# Patient Record
Sex: FEMALE | Race: WHITE | NOT HISPANIC OR LATINO | Employment: FULL TIME | URBAN - METROPOLITAN AREA
[De-identification: names, ages, dates, MRNs, and addresses within clinical notes are randomized per-mention and may not be internally consistent; named-entity substitution may affect disease eponyms.]

---

## 2017-01-11 ENCOUNTER — PRENATAL OFFICE VISIT (OUTPATIENT)
Dept: FAMILY MEDICINE | Facility: CLINIC | Age: 32
End: 2017-01-11
Payer: COMMERCIAL

## 2017-01-11 VITALS
OXYGEN SATURATION: 99 % | BODY MASS INDEX: 30.86 KG/M2 | DIASTOLIC BLOOD PRESSURE: 60 MMHG | SYSTOLIC BLOOD PRESSURE: 110 MMHG | HEIGHT: 66 IN | TEMPERATURE: 96.4 F | HEART RATE: 105 BPM | RESPIRATION RATE: 14 BRPM | WEIGHT: 192 LBS

## 2017-01-11 PROCEDURE — 99207 ZZC POST PARTUM EXAM: CPT | Performed by: FAMILY MEDICINE

## 2017-01-11 ASSESSMENT — PAIN SCALES - GENERAL: PAINLEVEL: NO PAIN (0)

## 2017-01-11 NOTE — PROGRESS NOTES
"  Amy is here for a 6-week postpartum checkup.    She had a c/s for abnormal FHT of a viable boy, weight 6 pounds 11 oz., with no complications. Date of delivery was 16. Since delivery, she has been breast feeding.  She has no signs of infection, bleeding or other complications.  She is not pregnant.  We discussed contraceptions and she has chosen none.      Post partum tubal: No  History of Gestational Diabetes? No  Type of Delivery:    Feeding Method:  Pumping  If initiated breast feeding and stopped, how long did you breast feed?:  She wants to pump and bottle for at least another month.     REVIEW OF SYSTEMS:  Ears/Nose/Throat: negative  Respiratory: negative  Cardiovascular: negative  Gastrointestinal: negative  Genitourinary: negative  Musculoskeletal: negative    Neurologic: negative   Skin: negative   Endocrine:  negative  Vagina: negative  Cervix: negative  Breasts: negative  Vulva: negative  Episiotomy: NA    Contraception Plan: none    Medical History Reviewed yes - updated   Family History Reviewed yes - updated   Problem List Updated yes - updated     EXAM:  /60 mmHg  Pulse 105  Temp(Src) 96.4  F (35.8  C) (Temporal)  Resp 14  Ht 5' 6\" (1.676 m)  Wt 192 lb (87.091 kg)  BMI 31.00 kg/m2  SpO2 99%  LMP 2016  HEENT: grossly normal.  NECK: no lymphadenopathy or thyroidomegaly.  LUNGS: CTA X 2, no rales or crackles.  BACK: No spinal or CVA tenderness.  HEART: RRR without murmurs clicks or gallops.  ABDOMEN: soft, non tender, good bowel sounds, without masses rebound, guarding or tenderness.  PELVIC:  No pelvic exam was indicated.   EXTREMITIES:  warm to touch, good pulses, no ankle edema or calf tenderness.  NEUROLOGIC: grossly normal.    ASSESSMENT:   6-week postpartum exam after c/s for abnormal FHT.    PLAN:    Contraception methods discussed  Discussed calcium intake, vitamins and supplements  Exercise encouraged  Follow up in 1 year  Increase fiber intake  Increase " fluid intake  Seat belt use encouraged  Weight loss recommended.  One-hour glucose tolerance test needed? No  Nothing for contraception at this time.  She might want to start a combination pill once she stops pumping.  She will let me know. Both she and her  are ok if they get pregnant right away.  I told them that it is best to let her CS scar heal for a good year prior to stressing it with another pregnancy.      Electronically signed by:  Mello Nguyễn M.D.  1/11/2017

## 2017-01-11 NOTE — NURSING NOTE
"Chief Complaint   Patient presents with     Prenatal Care     post partum       Initial /60 mmHg  Pulse 105  Temp(Src) 96.4  F (35.8  C) (Temporal)  Resp 14  Ht 5' 6\" (1.676 m)  Wt 192 lb (87.091 kg)  BMI 31.00 kg/m2  SpO2 99%  LMP 02/24/2016 Estimated body mass index is 31 kg/(m^2) as calculated from the following:    Height as of this encounter: 5' 6\" (1.676 m).    Weight as of this encounter: 192 lb (87.091 kg).  BP completed using cuff size: juan Álvarez MA 1/11/2017        "

## 2017-08-22 ENCOUNTER — OFFICE VISIT (OUTPATIENT)
Dept: BEHAVIORAL HEALTH | Facility: CLINIC | Age: 32
End: 2017-08-22
Payer: COMMERCIAL

## 2017-08-22 DIAGNOSIS — F43.23 ADJUSTMENT DISORDER WITH MIXED ANXIETY AND DEPRESSED MOOD: Primary | ICD-10-CM

## 2017-08-22 PROCEDURE — 99207 ZZC NO CHARGE BEHAVIORAL WARM HANDOFF: CPT | Performed by: MARRIAGE & FAMILY THERAPIST

## 2017-08-22 NOTE — MR AVS SNAPSHOT
After Visit Summary   8/22/2017    Amy Moore    MRN: [id number]           Patient Information     Date Of Birth          1985        Visit Information        Provider Department      8/22/2017 1:45 PM Ruth Heebrt LMFT [facility]        Today's Diagnoses     Adjustment disorder with mixed anxiety and depressed mood    -  1       Follow-ups after your visit        Your next 10 appointments already scheduled     Sep 07, 2017  8:30 AM CDT   New Visit with JAYDEN Valdes   [facility] ([facility]    [address] [place] [id number] [phone]              Who to contact     If you have questions or need follow up information about today's clinic visit or your schedule please contact [facility] directly at [phone].  Normal or non-critical lab and imaging results will be communicated to you by MyChart, letter or phone within 4 business days after the clinic has received the results. If you do not hear from us within 7 days, please contact the clinic through [employer] or phone. If you have a critical or abnormal lab result, we will notify you by phone as soon as possible.  Submit refill requests through [employer] or call your pharmacy and they will forward the refill request to us. Please allow 3 business days for your refill to be completed.          Additional Information About Your Visit        MyChart Information     [employer] gives you secure access to your electronic health record. If you see a primary care provider, you can also send messages to your care team and make appointments. If you have questions, please call your primary care clinic.  If you do not have a primary care provider, please call [phone] and they will assist you.        Care EveryWhere ID     This is your Care EveryWhere ID. This could be used by other organizations to access your [facility] medical records  [id number]          Blood Pressure from Last 3 Encounters:   01/11/17 110/60   11/22/16 122/76   11/17/16 105/50    Weight from Last 3 Encounters:   01/11/17 87.1 kg (192 lb)   11/08/16 99.5 kg (219 lb 4 oz)   10/18/16 98.4 kg (217 lb)              Today, you had the following     No orders found for display       Primary Care Provider Office Phone # Fax #    Mello Nguyễn [phone]       [address] [place] [address]        Equal Access to Services     [facility]: Hadii margarito coronado hadasho Soomaali, waaxda luqadaha, qaybta kaalmada aderoyeryada, grant raman . So [facility] [phone].    ATENCIÓN: Si habla español, tiene a encinas disposición servicios gratuitos de asistencia lingüística. [facility] [phone].    We comply with applicable federal civil rights laws and [place] laws. We do not discriminate on the basis of race, color, national origin, age, disability sex, sexual orientation or gender identity.            Thank you!     Thank you for choosing [facility]  for your care. Our goal is always to provide you with excellent care. Hearing back from our patients is one way we can continue to improve our services. Please take a few minutes to complete the written survey that you may receive in the mail after your visit with us. Thank you!             Your Updated Medication List - Protect others around you: Learn how to safely use, store and throw away your medicines at www.disposemymeds.org.          This list is accurate as of: 8/22/17  1:48 PM.  Always use your most recent med list.                   Brand Name Dispense Instructions for use Diagnosis    order for DME     1 Device    Equipment being ordered: Electric Breast Pump  Sig:  Use as directed.    Breast feeding status of mother       PRENATAL VITAMIN PO      Take 1 tablet by mouth daily

## 2017-08-22 NOTE — PROGRESS NOTES
Warm-handoff    C met with patient, by PCP request. C informed and explained integrated health model, use of brief therapy interventions, as well as referrals and support services for ongoing long-term therapy.  Patient was in clinic for her son's well child check and she informed her PCP that she is having difficulty with anxiety and more tearfulness. Patient states that there is stress with changes in her work schedule and balancing everything in her life. She identified that she feels bad about meeting with a therapist and that she is also wanting things to change. Validated and normalized patient's emotions. Reframed that it takes strength to recognize when you want to make change and take the steps to do so. Patient scheduled an initial visit with this writer for 9/7/17.

## 2017-09-07 ENCOUNTER — OFFICE VISIT (OUTPATIENT)
Dept: BEHAVIORAL HEALTH | Facility: CLINIC | Age: 32
End: 2017-09-07
Payer: COMMERCIAL

## 2017-09-07 DIAGNOSIS — F43.21 ADJUSTMENT DISORDER WITH DEPRESSED MOOD: ICD-10-CM

## 2017-09-07 DIAGNOSIS — F41.1 GAD (GENERALIZED ANXIETY DISORDER): Primary | ICD-10-CM

## 2017-09-07 PROCEDURE — 90791 PSYCH DIAGNOSTIC EVALUATION: CPT | Performed by: MARRIAGE & FAMILY THERAPIST

## 2017-09-07 ASSESSMENT — ANXIETY QUESTIONNAIRES
IF YOU CHECKED OFF ANY PROBLEMS ON THIS QUESTIONNAIRE, HOW DIFFICULT HAVE THESE PROBLEMS MADE IT FOR YOU TO DO YOUR WORK, TAKE CARE OF THINGS AT HOME, OR GET ALONG WITH OTHER PEOPLE: SOMEWHAT DIFFICULT
6. BECOMING EASILY ANNOYED OR IRRITABLE: MORE THAN HALF THE DAYS
2. NOT BEING ABLE TO STOP OR CONTROL WORRYING: NEARLY EVERY DAY
1. FEELING NERVOUS, ANXIOUS, OR ON EDGE: MORE THAN HALF THE DAYS
GAD7 TOTAL SCORE: 15
7. FEELING AFRAID AS IF SOMETHING AWFUL MIGHT HAPPEN: MORE THAN HALF THE DAYS
3. WORRYING TOO MUCH ABOUT DIFFERENT THINGS: NEARLY EVERY DAY
5. BEING SO RESTLESS THAT IT IS HARD TO SIT STILL: SEVERAL DAYS

## 2017-09-07 ASSESSMENT — PATIENT HEALTH QUESTIONNAIRE - PHQ9
5. POOR APPETITE OR OVEREATING: MORE THAN HALF THE DAYS
SUM OF ALL RESPONSES TO PHQ QUESTIONS 1-9: 6

## 2017-09-07 NOTE — PROGRESS NOTES
[facility]  Integrated Behavioral Health Services   Diagnostic Assessment      PATIENT'S NAME: Amy Moore  MRN:   [id number]  :   1985  DATE OF SERVICE: 2017  SERVICE LOCATION: Face to Face in Clinic  Visit Activities: NEW and [facility] Only      Identifying Information:  Patient is a 32 year old year old, ,  female.  Patient attended the session alone.        Referral:  Patient was referred for an assessment by Dr. Mello Nguyễn at [facility].   Reason for referral: clarify behavioral health diagnosis and determine behavioral health treatment options.       Patient's Statement of Presenting Concern:  Patient reports the following reason(s) for seeking an assessment at this time: anxiety and tearfulness. Patient reports that she will worry about many different things. She states that it will be hard for her to be able to relax. She states that this mostly stems from family dynamics that shifted after the birth of their son. She states that she felt there was a lack of respect and understanding for choices she was making in her parenting. She reports increased conflict in her marriage as a result of this as well. She states that this has caused her to often question herself as well. She states that when she returned to work she often worried about her son with his  provider. She states that recently this  provider decided suddenly to quit doing  and they have had to have various people watch their son until they can get him somewhere more permanent. Patient stated that her symptoms have resulted in the following functional impairments: home life with spouse, self-care, social interactions and work / vocational responsibilities      History of Presenting Concern:  Patient reports that these problem(s) began almost 10 months ago, after the birth of her son. Patient states that she has always tended to be  "more of a worrier, however she felt she would worry about something and it didn't impact other aspects of her life and she would be able to move forward from it pretty easily. Patient has not attempted to resolve these concerns in the past. Patient reports that other professional(s) are involved in providing support / services. Patient's PCP recommended that patient meet with this writer. Patient reports that after the birth of her son, her mom was with them, from out of state to help out. This caused her mother-in-law to have hurt feelings as she felt that she was not as involved as much. Patient states that things worsened from there when patient was not comfortable exposing her  son to sick family members and she felt criticized for making this decision. Patient states that her return to work involved a change in schedule that was uncomfortable with her balancing work and personal life. She states that this week she was able to return to her former schedule and she is feeling better about it.      Social History:  Patient reported she grew up in [place], [place]. They were the third born of 3 children; she has two older sisters.  Patient reported that her childhood was \"really good\". Her parents are  and have been for over 40 years. [place] in [place] and was always very close with her dad. She became closer with her mom during her senior year of high school. She is close with her sisters.  Patient reported a history of 3 committed relationships or marriages. She has been in [place] for almost 5 years and she came here because of her relationship with her spouse. Patient has been  for 2 years. Patient reported having 1 child, whom is 10 months old. Patient identified some stable and meaningful social connections. Patient's family lives in [place]. She has some friends around here, however most of her friends are in [place].    Patient lives with her  and their son.  " Patient is currently employed part time.  Work history includes being a dental hygienist.    Patient reported that she has not been involved with the legal system.  Patient's highest education level was associate degree / vocational certificate. Patient did not identify any learning problems. There are no ethnic, cultural or Hindu factors that may be relevant for therapy.  Patient did not serve in the .       Mental Health History:  Patient reported the following biological family members or relatives with mental health issues: paternal uncle experienced Schizophrenia. Patient has received the following mental health services in the past: counseling. Patient is not currently receiving any mental health services.  When in college, her grandpa passed away and she met with a counselor to help her work through the grief. She met with the counselor for three visits.    Chemical Health History:  Patient reported the following biological family members or relatives with chemical health issues: Uncle reportedly used drugs, though type was undisclosed. Patient has not received chemical dependency treatment in the past. Patient is not currently receiving any chemical dependency treatment. Patient reports no problems as a result of their drinking / drug use.  Patient reports use of alcohol as very infrequent. She may have a glass of wine if going out to eat once in a while.  Patient denies use of cannabis and other illicit drugs.  Patient denies use of tobacco.  Patient reports use of caffeine in the form of 1 cup of coffee in the morning.    Cage-AID score is: negative. Based on Cage-Aid score and clinical interview there  are not indications of drug or alcohol abuse.      Discussed the general effects of drugs and alcohol on health and well-being.      Significant Losses / Trauma / Abuse / Neglect Issues:  There are indications or report of significant loss, trauma, abuse or neglect issues related to: death of a  good friend from high school when they were in college, her paternal grandparents have both passed, her paternal uncle passed away and he had mental health issues, her maternal grandpa passed away when she was in college and she was very close with him. Her maternal grandma passed away about a year and a half ago.     Issues of possible neglect are not present.      Medical History:   Patient Active Problem List   Diagnosis     Slow transit constipation     Weight gain     Pelvic pain in female     Supervision of normal first pregnancy     Encounter for triage in pregnant patient     Normal labor     S/P  section       Medication Review:  Current Outpatient Prescriptions   Medication     order for DME     Prenatal Vit-Fe Fumarate-FA (PRENATAL VITAMIN PO)     No current facility-administered medications for this visit.        Patient was provided recommendation to follow-up with physician.    Mental Status Assessment:  Appearance:   Appropriate   Eye Contact:   Good   Psychomotor Behavior: Normal   Attitude:   Cooperative   Orientation:   All  Speech   Rate / Production: Normal    Volume:  Normal   Mood:    Anxious  Sad   Affect:    Appropriate   Thought Content:  Clear   Thought Form:  Coherent  Logical   Insight:    Good       Safety Assessment:    Patient denies a history of suicidal ideation, suicide attempts, self-injurious behavior, homicidal ideation, homicidal behavior and and other safety concerns  Patient denies current or recent suicidal ideation or behaviors.  Patient denies current or recent homicidal ideation or behaviors.  Patient denies current or recent self injurious behavior or ideation.  Patient denies other safety concerns.  Patient reports there are firearms in the house. The firearms are secured in a locked space  Protective Factors Children in the home , Sense of responsibility to family, Life Satisfaction, Reality testing ability, Positive coping skills and Positive social support    Risk Factors Current high stress      Plan for Safety and Risk Management:  A safety and risk management plan has not been developed at this time, however patient was encouraged to call [place] [phone] should there be a change in any of these risk factors.      Review of Symptoms:  Depression: Interest Guilt Appetite  Joanie:  No symptoms  Psychosis: No symptoms  Anxiety: Worries Nervousness  Panic:  No symptoms  Post Traumatic Stress Disorder: No symptoms  Obsessive Compulsive Disorder: Cleaning patient reports that she likes her home to be clean and likes things done a certain way. Patient reports that this has improved over time, however even if she is not doing something it will still bother her  Eating Disorder: No symptoms  Oppositional Defiant Disorder: No symptoms  ADD / ADHD: No symptoms  Conduct Disorder: No symptoms    Patient's Strengths and Limitations:  Patient identified the following strengths or resources that will help her succeed in counseling: commitment to health and well being and intelligence. Patient identified the following supports: spouse. Things that may interfere with the patien'ts success in behavioral health services include:None identified.    Diagnostic Criteria:  A. Excessive anxiety and worry about a number of events or activities (such as work or school performance).   B. The person finds it difficult to control the worry.  C. Select 3 or more symptoms (required for diagnosis). Only one item is required in children.   - Restlessness or feeling keyed up or on edge.    - Being easily fatigued.    - Difficulty concentrating or mind going blank.    - Irritability.    - Muscle tension.   D. The focus of the anxiety and worry is not confined to features of an Axis I disorder.  E. The anxiety, worry, or physical symptoms cause clinically significant distress or impairment in social, occupational, or other important areas of functioning.   F. The disturbance is not due to the direct  physiological effects of a substance (e.g., a drug of abuse, a medication) or a general medical condition (e.g., hyperthyroidism) and does not occur exclusively during a Mood Disorder, a Psychotic Disorder, or a Pervasive Developmental Disorder.    - The aformentioned symptoms began 10 month(s) ago and occurs 7 days per week and is experienced as moderate.  A. The development of emotional or behavioral symptoms in response to an identifiable stressor(s) occurring within 3 months of the onset of the stressor(s)  B. These symptoms or behaviors are clinically significant, as evidenced by one or both of the following:       - Marked distress that is out of proportion to the severity/intensity of the stressor (with consideration for external context & culture)  C. The stress-related disturbance does not meet criteria for another disorder & is not not an exacerbation of another mental disorder  D. The symptoms do not represent normal bereavement  E. Once the stressor or its consequences have terminated, the symptoms do not persist for more than an additional 6 months       * Adjustment Disorder with Depressed Mood: The predominant manifestations are symptoms such as low mood, tearfulness, or feelings of hopelessness      Functional Status:  Patient's symptoms have caused reduced functional status in the following areas: Activities of Daily Living - low energy and motivation to get things done, she will second guess choices she makes  Occupational / Vocational - difficulty with focus and concentration due to worrying about her son while in   Social / Relational - increased personal conflict in marriage, few friends nearby      DSM5 Diagnoses: (Sustained by DSM5 Criteria Listed Above)  Diagnoses: 300.02 (F41.1) Generalized Anxiety Disorder  Adjustment Disorders  309.0 (F43.21) With depressed mood  Psychosocial & Contextual Factors: primary support group  WHODAS Score: 17  See Media section of EPIC medical record for  completed WHODAS    Preliminary Treatment Plan:    The client reports no currently identified Hoahaoism, ethnic or cultural issues relevant to therapy.    Initial Treatment will focus on: Anxiety - excess worry about different things, hard time with focus and concentration, trouble relaxing, sense of impending doom, increased irritability  Adjustment Difficulties related to: family concerns.    Chemical dependency recommendations: No indications of CD issues    As a preliminary treatment goal, patient will experience a reduction in anxiety, will develop more effective coping skills to manage anxiety symptoms, will develop healthy cognitive patterns and beliefs and will increase ability to function adaptively and will develop coping/problem-solving skills to facilitate more adaptive adjustment.    The focus of initial interventions will be to alleviate anxiety, alleviate depressed mood, increase coping skills, increase self esteem, teach CBT skills, teach relaxation strategies and teach stress mangement techniques.    The patient is receiving treatment / structured support from the following professional(s) / service and treatment. Collaboration will be initiated with: primary care physician.    Referral to another professional/service is not indicated at this time..    A Release of Information is not needed at this time.    Report to child or adult protection services was NA.    JAYDEN Valdes, Behavioral Health Clinician

## 2017-09-08 ASSESSMENT — ANXIETY QUESTIONNAIRES: GAD7 TOTAL SCORE: 15

## 2017-09-09 PROBLEM — F41.1 GAD (GENERALIZED ANXIETY DISORDER): Status: ACTIVE | Noted: 2017-09-09

## 2017-09-09 PROBLEM — F43.21 ADJUSTMENT DISORDER WITH DEPRESSED MOOD: Status: ACTIVE | Noted: 2017-09-07

## 2017-09-09 PROBLEM — F43.21 ADJUSTMENT DISORDER WITH DEPRESSED MOOD: Status: ACTIVE | Noted: 2017-09-09

## 2017-09-09 PROBLEM — F41.1 GAD (GENERALIZED ANXIETY DISORDER): Status: ACTIVE | Noted: 2017-09-07

## 2017-09-21 ENCOUNTER — OFFICE VISIT (OUTPATIENT)
Dept: BEHAVIORAL HEALTH | Facility: CLINIC | Age: 32
End: 2017-09-21
Payer: COMMERCIAL

## 2017-09-21 DIAGNOSIS — F43.21 ADJUSTMENT DISORDER WITH DEPRESSED MOOD: ICD-10-CM

## 2017-09-21 DIAGNOSIS — F41.1 GAD (GENERALIZED ANXIETY DISORDER): Primary | ICD-10-CM

## 2017-09-21 PROCEDURE — 90834 PSYTX W PT 45 MINUTES: CPT | Performed by: MARRIAGE & FAMILY THERAPIST

## 2017-09-21 NOTE — PROGRESS NOTES
[facility]  September 21, 2017      Behavioral Health Clinician Progress Note    Patient Name: Amy Moore           Service Type:  Individual      Service Location:   Face to Face in Clinic     Session Start Time: 3:22  Session End Time: 4:10      Session Length: 38 - 52      Attendees: Patient    Visit Activities (Refresh list every visit): [facility] Only    Diagnostic Assessment Date: 9/7/17  Treatment Plan Review Date: due next visit  See Flowsheets for today's PHQ-9 and LORI-7 results  Previous PHQ-9:   PHQ-9 SCORE 9/7/2017   Total Score 6     Previous LORI-7:   LORI-7 SCORE 9/7/2017   Total Score 15       OWEN LEVEL:  OWEN Score (Last Two) 1/12/2016   OWEN Raw Score 42   Activation Score 66   OWEN Level 3       DATA  Extended Session (60+ minutes): No  Interactive Complexity: No  Crisis: No  [facility] Patient: No    Treatment Objective(s) Addressed in This Session:  Target Behavior(s): anxiety and adjustment issues    Anxiety: will experience a reduction in anxiety, will develop more effective coping skills to manage anxiety symptoms, will develop healthy cognitive patterns and beliefs and will increase ability to function adaptively  Adjustment Difficulties: will develop coping/problem-solving skills to facilitate more adaptive adjustment    Current Stressors / Issues:  Patient reports that she feels better with her son being out of his old . She reflected that she didn't realize how much stress it was causing her until he was placed in a new .   Patient reports that there has been more conflict between her and her spouse. She states that she feels guilty as she sees that he is working on their relationship, and she doesn't feel she is putting in the same effort. She identified that she is more defensive when he says things. She states that she feels that he is questioning her rather suggesting different options or trying to calm her down. Explored self talk and reflected negative  self talk and cognitive distortions. Discussed ways to challenge distorted thought patterns and shift thoughts to be more realistic and appropriate.    Patient states that she is trying to balance everything. She would like to workout again as she knows that this helps her. Brainstormed ways to incorporate this into her schedule more regularly.     Progress on Treatment Objective(s) / Homework:  Minimal progress - ACTION (Actively working towards change); Intervened by reinforcing change plan / affirming steps taken    Motivational Interviewing    MI Intervention: Expressed Empathy/Understanding, Supported Autonomy, Collaboration, Evocation, Permission to raise concern or advise, Open-ended questions, Reflections: simple and complex, Change talk (evoked) and Reframe     Change Talk Expressed by the Patient: Desire to change Ability to change Reasons to change Need to change Committment to change Activation Taking steps    Provider Response to Change Talk: E - Evoked more info from patient about behavior change, A - Affirmed patient's thoughts, decisions, or attempts at behavior change, R - Reflected patient's change talk and S - Summarized patient's change talk statements    Also provided psychoeducation about behavioral health condition, symptoms, and treatment options      Care Plan review completed: Yes    Medication Review:  No current psychiatric medications prescribed    Medication Compliance:  NA    Changes in Health Issues:   None reported    Chemical Use Review:   Substance Use: Chemical use reviewed, no active concerns identified      Tobacco Use: No current tobacco use.      Assessment: Current Emotional / Mental Status (status of significant symptoms):  Risk status (Self / Other harm or suicidal ideation)  Patient denies a history of suicidal ideation, suicide attempts, self-injurious behavior, homicidal ideation, homicidal behavior and and other safety concerns  Patient denies current fears or concerns  for personal safety.  Patient denies current or recent suicidal ideation or behaviors.  Patient denies current or recent homicidal ideation or behaviors.  Patient denies current or recent self injurious behavior or ideation.  Patient denies other safety concerns.  A safety and risk management plan has not been developed at this time, however patient was encouraged to call [place] [phone] should there be a change in any of these risk factors.    Appearance:   Appropriate   Eye Contact:   Good   Psychomotor Behavior: Normal   Attitude:   Cooperative   Orientation:   All  Speech   Rate / Production: Normal    Volume:  Normal   Mood:    Anxious  Depressed   Affect:    Appropriate   Thought Content:  Clear   Thought Form:  Coherent  Logical   Insight:    Good     Diagnoses:  1. LORI (generalized anxiety disorder)    2. Adjustment disorder with depressed mood        Collateral Reports Completed:  Not Applicable    Plan: (Homework, other):  Patient was given information about behavioral services and encouraged to schedule a follow up appointment with the clinic [facility] in 1-2 weeks.  She was also given information about mental health symptoms and treatment options , Cognitive Behavioral Therapy skills to practice when experiencing anxiety and depression and deep Breathing Strategies to practice when experiencing anxiety and depression.  CD Recommendations: No indications of CD issues.  JAYDEN Day, [facility]

## 2017-09-21 NOTE — MR AVS SNAPSHOT
After Visit Summary   9/21/2017    Amy Moore    MRN: [id number]           Patient Information     Date Of Birth          1985        Visit Information        Provider Department      9/21/2017 3:00 PM Ruth Hebert LMFT [facility]        Today's Diagnoses     LORI (generalized anxiety disorder)    -  1    Adjustment disorder with depressed mood           Follow-ups after your visit        Who to contact     If you have questions or need follow up information about today's clinic visit or your schedule please contact [facility] directly at [phone].  Normal or non-critical lab and imaging results will be communicated to you by [email or web address], letter or phone within 4 business days after the clinic has received the results. If you do not hear from us within 7 days, please contact the clinic through [email or web address] or phone. If you have a critical or abnormal lab result, we will notify you by phone as soon as possible.  Submit refill requests through [employer] or call your pharmacy and they will forward the refill request to us. Please allow 3 business days for your refill to be completed.          Additional Information About Your Visit        MyChart Information     [employer] gives you secure access to your electronic health record. If you see a primary care provider, you can also send messages to your care team and make appointments. If you have questions, please call your primary care clinic.  If you do not have a primary care provider, please call [phone] and they will assist you.        Care EveryWhere ID     This is your Care EveryWhere ID. This could be used by other organizations to access your [place] medical records  [id number]         Blood Pressure from Last 3 Encounters:   01/11/17 110/60   11/22/16 122/76   11/17/16 105/50    Weight from Last 3 Encounters:   01/11/17 87.1 kg (192 lb)   11/08/16 99.5 kg (219 lb 4 oz)   10/18/16 98.4 kg (217 lb)               Today, you had the following     No orders found for display       Primary Care Provider Office Phone # Fax #    Mello Nguyễn -[phone]       [address] [place] [address]        Equal Access to Services     GABRIELLA CHAN : Hadii margarito ku hadfidelo Soomaali, waaxda luqadaha, qaybta kaalmada adeegyada, grant orquideain haybuckn leslyroyer thomson lamine marcos. So [facility] [phone].    ATENCIÓN: Si habla español, tiene a encinas disposición servicios gratuitos de asistencia lingüística. Llame al [phone].    We comply with applicable federal civil rights laws and [place] laws. We do not discriminate on the basis of race, color, national origin, age, disability sex, sexual orientation or gender identity.            Thank you!     Thank you for choosing [facility]  for your care. Our goal is always to provide you with excellent care. Hearing back from our patients is one way we can continue to improve our services. Please take a few minutes to complete the written survey that you may receive in the mail after your visit with us. Thank you!             Your Updated Medication List - Protect others around you: Learn how to safely use, store and throw away your medicines at www.disposemymeds.org.          This list is accurate as of: 9/21/17 11:59 PM.  Always use your most recent med list.                   Brand Name Dispense Instructions for use Diagnosis    order for DME     1 Device    Equipment being ordered: Electric Breast Pump  Sig:  Use as directed.    Breast feeding status of mother       PRENATAL VITAMIN PO      Take 1 tablet by mouth daily

## 2018-02-15 ENCOUNTER — OFFICE VISIT (OUTPATIENT)
Dept: FAMILY MEDICINE | Facility: CLINIC | Age: 33
End: 2018-02-15
Payer: COMMERCIAL

## 2018-02-15 VITALS
SYSTOLIC BLOOD PRESSURE: 122 MMHG | HEART RATE: 125 BPM | OXYGEN SATURATION: 100 % | WEIGHT: 195 LBS | TEMPERATURE: 98.5 F | BODY MASS INDEX: 31.34 KG/M2 | HEIGHT: 66 IN | DIASTOLIC BLOOD PRESSURE: 72 MMHG

## 2018-02-15 DIAGNOSIS — L01.00 IMPETIGO: Primary | ICD-10-CM

## 2018-02-15 DIAGNOSIS — R21 RASH: ICD-10-CM

## 2018-02-15 DIAGNOSIS — Z23 NEED FOR PROPHYLACTIC VACCINATION AND INOCULATION AGAINST INFLUENZA: ICD-10-CM

## 2018-02-15 PROCEDURE — 90471 IMMUNIZATION ADMIN: CPT | Performed by: FAMILY MEDICINE

## 2018-02-15 PROCEDURE — 90686 IIV4 VACC NO PRSV 0.5 ML IM: CPT | Performed by: FAMILY MEDICINE

## 2018-02-15 PROCEDURE — 99213 OFFICE O/P EST LOW 20 MIN: CPT | Mod: 25 | Performed by: FAMILY MEDICINE

## 2018-02-15 RX ORDER — CEPHALEXIN 500 MG/1
500 CAPSULE ORAL 4 TIMES DAILY
Qty: 40 CAPSULE | Refills: 0 | Status: SHIPPED | OUTPATIENT
Start: 2018-02-15 | End: 2018-02-25

## 2018-02-15 RX ORDER — TRIAMCINOLONE ACETONIDE 1 MG/G
CREAM TOPICAL
Qty: 15 G | Refills: 0 | Status: SHIPPED | OUTPATIENT
Start: 2018-02-15 | End: 2018-12-06

## 2018-02-15 ASSESSMENT — PAIN SCALES - GENERAL: PAINLEVEL: MILD PAIN (2)

## 2018-02-15 NOTE — PROGRESS NOTES
"  SUBJECTIVE:   Amy Moore is a 32 year old female who presents to clinic today for the following health issues:      Rash      Duration: 2 week    Description  Location: bilateral hand/wrist and neck  Itching: moderate    Intensity:  moderate    Accompanying signs and symptoms: swelling and redness, cold sx    History (similar episodes/previous evaluation): None    Precipitating or alleviating factors:  New exposures:  Got her hair done a 2 weeks ago  Recent travel: no      Therapies tried and outcome: baby lotion        Problem list and histories reviewed & adjusted, as indicated.  Additional history: as documented      Reviewed and updated as needed this visit by clinical staff  Tobacco  Allergies  Meds  Problems  Med Hx  Surg Hx  Fam Hx  Soc Hx        Reviewed and updated as needed this visit by Provider  Allergies  Meds  Problems         Patient here for evaluation of above-noted rash.  Rash first started in a single area on the top of the scalp after she had her hair done.  Next, it began to spread to the left wrist and spread around that area and then began on her right wrist, spread around that area, and is now on the neck.  It is quite itchy, and continues to worsen.  She notes that the spot on the top of her head is bumpy and feels a little wet at times with some crusting.  She has no history of any other dermatological conditions.    ROS:  10 point ROS of systems including Constitutional, Eyes, HENT, Respiratory, Cardiovascular, Gastroenterology, Genitourinary, Integumentary, Muscularskeletal, Psychiatric were all negative except for pertinent positives noted in my HPI.     OBJECTIVE:   /72  Pulse 125  Temp 98.5  F (36.9  C) (Temporal)  Ht 5' 6\" (1.676 m)  Wt 195 lb (88.5 kg)  LMP 02/05/2018 (Exact Date)  SpO2 100%  Breastfeeding? No  BMI 31.47 kg/m2  Body mass index is 31.47 kg/(m^2).  Physical Exam   Constitutional: She appears well-developed and well-nourished.   Skin: Rash " noted.   On top of the head if there is a raised honey colored crusted lesion with some mild oozing.    Bilateral wrists and neck have well demarcated erythematous lesions with mild crusting.  No purulence noted.  A few of the lesions look slightly wet.       ASSESSMENT/PLAN:       ICD-10-CM    1. Impetigo L01.00 cephALEXin (KEFLEX) 500 MG capsule   2. Rash R21 triamcinolone (KENALOG) 0.1 % cream   3. Need for prophylactic vaccination and inoculation against influenza Z23 FLU VAC, SPLIT VIRUS IM > 3 YO (QUADRIVALENT) [15648]     Vaccine Administration, Initial [00528]     PLAN:  1.  Patient's rash looks consistent with impetigo.  We will treat with Keflex ×10 days.  If her rash does not begin to improve after a few days of treatment, I informed her to contact the clinic for discussion on alternative diagnoses.    Andrae Morejon MD   [facility]     Injectable Influenza Immunization Documentation    1.  Is the person to be vaccinated sick today?   No    2. Does the person to be vaccinated have an allergy to a component   of the vaccine?   No  Egg Allergy Algorithm Link    3. Has the person to be vaccinated ever had a serious reaction   to influenza vaccine in the past?   No    4. Has the person to be vaccinated ever had Guillain-Barré syndrome?   No    Form completed by Odessa Andino CMA

## 2018-02-15 NOTE — MR AVS SNAPSHOT
"              After Visit Summary   2/15/2018    mAy Moore    MRN: [id number]           Patient Information     Date Of Birth          1985        Visit Information        Provider Department      2/15/2018 1:00 PM Andrae Morejon MD [facility]        Today's Diagnoses     Impetigo    -  1    Rash        Need for prophylactic vaccination and inoculation against influenza           Follow-ups after your visit        Who to contact     If you have questions or need follow up information about today's clinic visit or your schedule please contact [facility] directly at [phone].  Normal or non-critical lab and imaging results will be communicated to you by [employer] Designhart, letter or phone within 4 business days after the clinic has received the results. If you do not hear from us within 7 days, please contact the clinic through [employer] or phone. If you have a critical or abnormal lab result, we will notify you by phone as soon as possible.  Submit refill requests through [employer] or call your pharmacy and they will forward the refill request to us. Please allow 3 business days for your refill to be completed.          Additional Information About Your Visit        MyChart Information     [employer] gives you secure access to your electronic health record. If you see a primary care provider, you can also send messages to your care team and make appointments. If you have questions, please call your primary care clinic.  If you do not have a primary care provider, please call [phone] and they will assist you.        Care EveryWhere ID     This is your Care EveryWhere ID. This could be used by other organizations to access your [place] medical records  [id number]        Your Vitals Were     Pulse Temperature Height Last Period Pulse Oximetry Breastfeeding?    125 98.5  F (36.9  C) (Temporal) 5' 6\" (1.676 m) 02/05/2018 (Exact Date) 100% No    BMI (Body Mass Index)             "       31.47 kg/m2            Blood Pressure from Last 3 Encounters:   02/15/18 122/72   01/11/17 110/60   11/22/16 122/76    Weight from Last 3 Encounters:   02/15/18 195 lb (88.5 kg)   01/11/17 192 lb (87.1 kg)   11/08/16 219 lb 4 oz (99.5 kg)              We Performed the Following     FLU VAC, SPLIT VIRUS IM > 3 YO (QUADRIVALENT) [42185]     Vaccine Administration, Initial [57887]          Today's Medication Changes          These changes are accurate as of 2/15/18  1:45 PM.  If you have any questions, ask your nurse or doctor.               Start taking these medicines.        Dose/Directions    cephALEXin 500 MG capsule   Commonly known as:  KEFLEX   Used for:  Impetigo   Started by:  Andrae Morejon MD        Dose:  500 mg   Take 1 capsule (500 mg) by mouth 4 times daily for 10 days   Quantity:  40 capsule   Refills:  0       triamcinolone 0.1 % cream   Commonly known as:  KENALOG   Used for:  Rash   Started by:  Andrae Morejon MD        Apply sparingly to affected area three times daily as needed.   Quantity:  15 g   Refills:  0            Where to get your medicines      These medications were sent to [facility] [address]   [address] [place] [address] [address] [facility] [id number]     Phone:  [phone]     cephALEXin 500 MG capsule    triamcinolone 0.1 % cream                Primary Care Provider Office Phone # Fax #    Mello PAUL Nguyễn -[phone]       [address] [place] [address]   [facility] [place] [address]        Equal Access to Services     : Hadii margarito ugarteo Soleslee, waaxda luqadaha, qaybta kaalmada adeegglynn, grant raman . So [facility] [phone].    ATENCIÓN: Si habla español, tiene a encinas disposición servicios gratuitos de asistencia lingüística. Llame al [phone].    We comply with applicable federal civil rights laws and [place] laws. We do not discriminate on the basis of race, color, national origin, age,  disability, sex, sexual orientation, or gender identity.            Thank you!     Thank you for choosing [facility]  for your care. Our goal is always to provide you with excellent care. Hearing back from our patients is one way we can continue to improve our services. Please take a few minutes to complete the written survey that you may receive in the mail after your visit with us. Thank you!             Your Updated Medication List - Protect others around you: Learn how to safely use, store and throw away your medicines at www.disposemymeds.org.          This list is accurate as of 2/15/18  1:45 PM.  Always use your most recent med list.                   Brand Name Dispense Instructions for use Diagnosis    cephALEXin 500 MG capsule    KEFLEX    40 capsule    Take 1 capsule (500 mg) by mouth 4 times daily for 10 days    Impetigo       PRENATAL VITAMIN PO      Take 1 tablet by mouth daily        triamcinolone 0.1 % cream    KENALOG    15 g    Apply sparingly to affected area three times daily as needed.    Rash

## 2018-02-15 NOTE — NURSING NOTE
"Chief Complaint   Patient presents with     Derm Problem       Initial /72  Pulse 125  Temp 98.5  F (36.9  C) (Temporal)  Ht 5' 6\" (1.676 m)  Wt 195 lb (88.5 kg)  LMP 02/05/2018 (Exact Date)  SpO2 100%  Breastfeeding? No  BMI 31.47 kg/m2 Estimated body mass index is 31.47 kg/(m^2) as calculated from the following:    Height as of this encounter: 5' 6\" (1.676 m).    Weight as of this encounter: 195 lb (88.5 kg).  Medication Reconciliation: complete   Odessa Andino CMA    "

## 2018-07-03 ENCOUNTER — OFFICE VISIT (OUTPATIENT)
Dept: FAMILY MEDICINE | Facility: CLINIC | Age: 33
End: 2018-07-03
Payer: COMMERCIAL

## 2018-07-03 VITALS
SYSTOLIC BLOOD PRESSURE: 118 MMHG | TEMPERATURE: 98.4 F | WEIGHT: 196.6 LBS | OXYGEN SATURATION: 100 % | DIASTOLIC BLOOD PRESSURE: 64 MMHG | HEIGHT: 66 IN | HEART RATE: 102 BPM | BODY MASS INDEX: 31.6 KG/M2 | RESPIRATION RATE: 12 BRPM

## 2018-07-03 DIAGNOSIS — R10.2 PELVIC PAIN IN FEMALE: Primary | ICD-10-CM

## 2018-07-03 PROCEDURE — 99213 OFFICE O/P EST LOW 20 MIN: CPT | Performed by: OBSTETRICS & GYNECOLOGY

## 2018-07-03 NOTE — MR AVS SNAPSHOT
After Visit Summary   7/3/2018    Amy Moore    MRN: [id number]           Patient Information     Date Of Birth          1985        Visit Information        Provider Department      7/3/2018 11:10 AM Kashif Bales MD [facility]        Today's Diagnoses     Pelvic pain in female    -  1       Follow-ups after your visit        Your next 10 appointments already scheduled     Jul 12, 2018  3:40 PM CDT   US PELVIC COMPLETE W TRANSVAGINAL with PHUS1   [facility] ([facility])    [address] [place] [address] [phone]           Please bring a list of your medicines (including vitamins, minerals and over-the-counter drugs). Also, tell your doctor about any allergies you may have. Wear comfortable clothes and leave your valuables at home.  Adults: Drink six 8-ounce glasses of fluid one hour before your exam. Do NOT empty your bladder.  If you need to empty your bladder before your exam, try to release only a little bit of urine. Then, drink another 8oz glass of fluid.  Children: Children who are potty trained should drink at least 4 cups (32 oz) of liquid 45 minutes to one hour prior to the exam. The child s bladder must be full in order to achieve a diagnostic exam. If your child is very uncomfortable or has an urgent need to pee, please notify a technologist; they will try to find out how much longer the wait may be and provide instructions to help relieve the pressure. Occasionally it is medically necessary to insert a urinary catheter to fill the bladder.  Please call the Imaging Department at your exam site with any questions.              Future tests that were ordered for you today     Open Future Orders        Priority Expected Expires Ordered    US Pelvic Complete with Transvaginal Routine  7/4/2019 7/3/2018            Who to contact     If you have questions or need follow up information about today's  "clinic visit or your schedule please contact [facility] directly at [phone].  Normal or non-critical lab and imaging results will be communicated to you by MyChart, letter or phone within 4 business days after the clinic has received the results. If you do not hear from us within 7 days, please contact the clinic through ZeroDeskt[employer]art or phone. If you have a critical or abnormal lab result, we will notify you by phone as soon as possible.  Submit refill requests through [employer] or call your pharmacy and they will forward the refill request to us. Please allow 3 business days for your refill to be completed.          Additional Information About Your Visit        ZeroDesktopharNEMOPTIC Information     [employer] gives you secure access to your electronic health record. If you see a primary care provider, you can also send messages to your care team and make appointments. If you have questions, please call your primary care clinic.  If you do not have a primary care provider, please call [phone] and they will assist you.        Care EveryWhere ID     This is your Care EveryWhere ID. This could be used by other organizations to access your [place] medical records  [id number]        Your Vitals Were     Pulse Temperature Respirations Height Pulse Oximetry BMI (Body Mass Index)    102 98.4  F (36.9  C) (Temporal) 12 5' 6\" (1.676 m) 100% 31.73 kg/m2       Blood Pressure from Last 3 Encounters:   07/03/18 118/64   02/15/18 122/72   01/11/17 110/60    Weight from Last 3 Encounters:   07/03/18 196 lb 9.6 oz (89.2 kg)   02/15/18 195 lb (88.5 kg)   01/11/17 192 lb (87.1 kg)               Primary Care Provider Office Phone # Fax #    Mello Nguyễn -[phone]       [address] [place] [address]        Equal Access to Services     GABRIELLA CHAN AH: Mars ugarteo Soleslee, waaxda luqadaha, qaybta kaalmada adeegyada, grant marcos. So wa " [phone].    ATENCIÓN: Si valerie song, tiene a encinas disposición servicios gratuitos de asistencia lingüística. Kindra al [phone].    We comply with applicable federal civil rights laws and [place] laws. We do not discriminate on the basis of race, color, national origin, age, disability, sex, sexual orientation, or gender identity.            Thank you!     Thank you for choosing [facility]  for your care. Our goal is always to provide you with excellent care. Hearing back from our patients is one way we can continue to improve our services. Please take a few minutes to complete the written survey that you may receive in the mail after your visit with us. Thank you!             Your Updated Medication List - Protect others around you: Learn how to safely use, store and throw away your medicines at www.disposemymeds.org.          This list is accurate as of 7/3/18 11:39 AM.  Always use your most recent med list.                   Brand Name Dispense Instructions for use Diagnosis    PRENATAL VITAMIN PO      Take 1 tablet by mouth daily        triamcinolone 0.1 % cream    KENALOG    15 g    Apply sparingly to affected area three times daily as needed.    Rash

## 2018-07-12 ENCOUNTER — HOSPITAL ENCOUNTER (OUTPATIENT)
Dept: ULTRASOUND IMAGING | Facility: CLINIC | Age: 33
Discharge: HOME | End: 2018-07-12
Attending: OBSTETRICS & GYNECOLOGY | Admitting: OBSTETRICS & GYNECOLOGY
Payer: COMMERCIAL

## 2018-07-12 DIAGNOSIS — R10.2 PELVIC PAIN IN FEMALE: ICD-10-CM

## 2018-07-12 PROCEDURE — 76856 US EXAM PELVIC COMPLETE: CPT

## 2018-07-13 ENCOUNTER — MYC MEDICAL ADVICE (OUTPATIENT)
Dept: FAMILY MEDICINE | Facility: CLINIC | Age: 33
End: 2018-07-13

## 2018-07-13 ENCOUNTER — TELEPHONE (OUTPATIENT)
Dept: FAMILY MEDICINE | Facility: CLINIC | Age: 33
End: 2018-07-13

## 2018-07-13 DIAGNOSIS — N83.201 OVARIAN CYST, RIGHT: Primary | ICD-10-CM

## 2018-07-13 NOTE — TELEPHONE ENCOUNTER
----- Message from Kashif Bales MD sent at 7/12/2018  8:38 PM CDT -----  Jessica Please inform Amy/ or caretaker  that this result(s) is/are showing that she has a 4 cm cyst on the right ovary- if she ever has intense pain we can consider draining this cyst laparoscopically- let me know if she wants that done-  Also she should go to ER if her pain ever gets intense and it is after clinic  Hours- to rule out torsion-  At the very least we should rechekc an US in 2 months- please set that up for her-Thanks. REBECCA Bales MD

## 2018-07-13 NOTE — TELEPHONE ENCOUNTER
Patient returned call, message per Dr Bales was relayed to patient.  Patient states understanding of the message.  She does have a question about conceiving and at this point she states waiting the 2 mos for the US, unless there is a chance this will affect getting pregnant.  Please advise.  Patient states a Mychart message is fine too.  Thank you,  Gill Khalil  Patient Representative

## 2018-07-13 NOTE — PROGRESS NOTES
Jessica Please inform Amy/ or caretaker  that this result(s) is/are showing that she has a 4 cm cyst on the right ovary- if she ever has intense pain we can consider draining this cyst laparoscopically- let me know if she wants that done-  Also she should go to ER if her pain ever gets intense and it is after clinic  Hours- to rule out torsion-  At the very least we should rechekc an US in 2 months- please set that up for her-Thanks. REBECCA Bales MD

## 2018-07-13 NOTE — TELEPHONE ENCOUNTER
Left message for patient to return call to clinic.  Please inform of message below and route call back to care team with what patient prefers to do for treatment.  Jessica Ko, CMA

## 2018-07-13 NOTE — TELEPHONE ENCOUNTER
Per RM the best option the patient could do would be to wait on getting pregnant until we repeat the US. If she were to get pregnant, the pregnancy could make the cyst get bigger and we would have less options of treatments due to the pregnancy at that point. Other option would be to drain the cyst before she tries conceiving but he would prefer the above method.    I did leave a message on [facility] to inform her I sent a mychart per her request but to call with any questions.

## 2018-07-17 NOTE — TELEPHONE ENCOUNTER
Spoke to patient, patient wanted help scheduling her follow up US. appt scheduled. No further action needed  Jessica Ko CMA

## 2018-09-04 ENCOUNTER — HOSPITAL ENCOUNTER (OUTPATIENT)
Dept: ULTRASOUND IMAGING | Facility: CLINIC | Age: 33
Discharge: HOME | End: 2018-09-04
Attending: OBSTETRICS & GYNECOLOGY | Admitting: OBSTETRICS & GYNECOLOGY
Payer: COMMERCIAL

## 2018-09-04 DIAGNOSIS — N83.201 OVARIAN CYST, RIGHT: ICD-10-CM

## 2018-09-04 PROCEDURE — 76830 TRANSVAGINAL US NON-OB: CPT

## 2018-09-04 NOTE — PROGRESS NOTES
Jessica Please inform Amy/ or caretaker  that this result(s) is/are much improved- the previous 4 cm cyst on the right ovary is now shrunk down to 1.5 cm- I would advise that she rechekc an ultrasound in 2 more months to make sure it goes away completely. rechekc sooner if she develops any pain-Sherie. REBECCA Bales MD

## 2018-10-22 ENCOUNTER — OFFICE VISIT (OUTPATIENT)
Dept: URGENT CARE | Facility: RETAIL CLINIC | Age: 33
End: 2018-10-22
Payer: COMMERCIAL

## 2018-10-22 VITALS
OXYGEN SATURATION: 96 % | DIASTOLIC BLOOD PRESSURE: 76 MMHG | TEMPERATURE: 98.5 F | SYSTOLIC BLOOD PRESSURE: 116 MMHG | HEART RATE: 81 BPM

## 2018-10-22 DIAGNOSIS — R05.9 COUGH: ICD-10-CM

## 2018-10-22 DIAGNOSIS — J01.90 ACUTE SINUSITIS WITH SYMPTOMS > 10 DAYS: Primary | ICD-10-CM

## 2018-10-22 DIAGNOSIS — J22 LOWER RESPIRATORY INFECTION: ICD-10-CM

## 2018-10-22 PROCEDURE — 99214 OFFICE O/P EST MOD 30 MIN: CPT | Performed by: PHYSICIAN ASSISTANT

## 2018-10-22 RX ORDER — ALBUTEROL SULFATE 90 UG/1
2 AEROSOL, METERED RESPIRATORY (INHALATION) EVERY 6 HOURS PRN
Qty: 1 INHALER | Refills: 1 | Status: SHIPPED | OUTPATIENT
Start: 2018-10-22 | End: 2018-12-06

## 2018-10-22 NOTE — PROGRESS NOTES
Chief Complaint   Patient presents with     Cough     1 month         SUBJECTIVE:   Pt. presenting to [facility] -  with a chief complaint of cough off and on and then last few days feverish and hoarse and > cough. Ongoing nasal sinus congestion x weeks - off and on - > past few days.  Tired.  See CC.  Cough nonproductive.No SOB or chest pain. Gets in coughing jags  Hx of asthma - none  Here with spouse and son.  Onset of symptoms gradual  Course of illness is worsening.    Severity moderate  Current and Associated symptoms: fever, runny nose, stuffy nose, cough - non-productive, hoarse voice, facial pain/pressure, headache and fatigue  Treatment measures tried include Tylenol/Ibuprofen, Decongestants and OTC Cough med.  Predisposing factors include None.  Last antibiotic 2018 Cep for impetigo    Pregnancy no  Smoker no    ROS:    Energy level is <  ENT - denies ear pain, throat pain.  CP - see above  GI- - appetite fair. No nausea, vomiting or diarrhea.   No bowel or bladder changes   MSK - no joint pain or swelling   Skin: No rashes    Past Medical History:   Diagnosis Date     Pelvic pain in female 2016     Slow transit constipation 2016     Weight gain 2016     Past Surgical History:   Procedure Laterality Date      SECTION N/A 2016    Procedure:  SECTION;  Surgeon: Merrick Justice MD;  Location: [facility]     NO HISTORY OF SURGERY       Patient Active Problem List   Diagnosis     Slow transit constipation     Weight gain     Pelvic pain in female     Supervision of normal first pregnancy     Encounter for triage in pregnant patient     Normal labor     S/P  section     LORI (generalized anxiety disorder)     Adjustment disorder with depressed mood     Current Outpatient Prescriptions   Medication     Prenatal Vit-Fe Fumarate-FA (PRENATAL VITAMIN PO)     triamcinolone (KENALOG) 0.1 % cream     No current facility-administered medications for this  visit.        OBJECTIVE:  /76  Pulse 81  Temp 98.5  F (36.9  C) (Oral)  SpO2 96%    GENERAL APPEARANCE: cooperative, alert and no distress. Appears well hydrated.  EYES: conjunctiva clear  HENT: Rt ear canal  clear and TM normal   Lt ear canal clear and TM normal   Nose mod congestion. thick discharge  Mouth without ulcers or lesions. no erythema. no exudate. PND noted  NECK: supple, few small shoddy NT ant nodes. No  posterior nodes.  RESP: lungs - no rales, rhonchi but few faint  wheezes. Breathing easily. Freq dry cough.  CV: regular rates and rhythm  ABDOMEN:  soft, nontender, no HSM or masses and bowel sounds normal   SKIN: no suspicious lesions or rashes  mod tenderness to palpate over  sinus areas.      ASSESSMENT:     Cough  Acute sinusitis with symptoms > 10 days  Lower respiratory infection      PLAN:  Symptomatic measures   Prescriptions as below. Discussed indications, dosing, side affects and adverse reactions of medications with  Patient -Albuterol and Augmentin.  Eat yogurt daily or take a probiotic supplement when on antibiotics.  OTC cough suppressant/expectorant discussed.  Salt water gargles  -throat lozenges or honey/lemon tea if soothing .  saline nasal spray for  nasal congestion .  Cool mist vaporizer.   Stay in clean air environment.  > rest.  > fluids.  Contagiousness and hygiene discussed.  Fever and pain  control measures discussed.   If unable to swallow or any breathing difficulty to go to ED .      AVS given and discussed:  Patient Instructions   Saline nose spray    Delsym at night    Please FOLLOW UP at primary care clinic if not improving, new symptoms, worse or this does not resolve.  [facility]  [phone]      PT is comfortable with this plan.  Electronically signed,  LAYA Duran, PAC

## 2018-10-22 NOTE — LETTER
[address] [address]        10/22/2018    Amy Reyes was seen 10/22/2018 at the [facility]. Please excuse Amy from work today and tomorrow due to illness. Amy may return to work when  no fever x 1 day and feeling better.      Cordially,        Alisa Duran, PAC

## 2018-10-22 NOTE — MR AVS SNAPSHOT
After Visit Summary   10/22/2018    Amy Moore    MRN: [id number]           Patient Information     Date Of Birth          1985        Visit Information        Provider Department      10/22/2018 11:40 AM Alisa Duran PA-C [facility]        Today's Diagnoses     Acute sinusitis with symptoms > 10 days    -  1    Cough        Lower respiratory infection          Care Instructions    Saline nose spray    Delsym at night    Please FOLLOW UP at primary care clinic if not improving, new symptoms, worse or this does not resolve.  [facility]  [phone]            Follow-ups after your visit        Who to contact     You can reach your care team any time of the day by calling [phone].  Notification of test results:  If you have an abnormal lab result, we will notify you by phone as soon as possible.         Additional Information About Your Visit        MyChart Information     [employer] gives you secure access to your electronic health record. If you see a primary care provider, you can also send messages to your care team and make appointments. If you have questions, please call your primary care clinic.  If you do not have a primary care provider, please call [phone] and they will assist you.        Care EveryWhere ID     This is your Care EveryWhere ID. This could be used by other organizations to access your [facility] medical records  [id number]        Your Vitals Were     Pulse Temperature Pulse Oximetry             81 98.5  F (36.9  C) (Oral) 96%          Blood Pressure from Last 3 Encounters:   10/22/18 116/76   07/03/18 118/64   02/15/18 122/72    Weight from Last 3 Encounters:   07/03/18 196 lb 9.6 oz (89.2 kg)   02/15/18 195 lb (88.5 kg)   01/11/17 192 lb (87.1 kg)              Today, you had the following     No orders found for display         Today's Medication Changes          These changes are accurate as of 10/22/18 12:47 PM.  If  you have any questions, ask your nurse or doctor.               Start taking these medicines.        Dose/Directions    albuterol 108 (90 Base) MCG/ACT inhaler   Commonly known as:  PROAIR HFA/PROVENTIL HFA/VENTOLIN HFA   Used for:  Cough   Started by:  Alisa Duran PA-C        Dose:  2 puff   Inhale 2 puffs into the lungs every 6 hours as needed for shortness of breath / dyspnea or wheezing   Quantity:  1 Inhaler   Refills:  1       amoxicillin-clavulanate 875-125 MG per tablet   Commonly known as:  AUGMENTIN   Used for:  Acute sinusitis with symptoms > 10 days, Lower respiratory infection   Started by:  Alisa Duran PA-C        Dose:  1 tablet   Take 1 tablet by mouth 2 times daily for 10 days   Quantity:  20 tablet   Refills:  0            Where to get your medicines      These medications were sent to [address]  [address], [facility] [address]     Phone:  [phone]     albuterol 108 (90 Base) MCG/ACT inhaler    amoxicillin-clavulanate 875-125 MG per tablet                Primary Care Provider Office Phone # Fax #    Mello Nguyễn [phone] [address] [place] [address]        Equal Access to Services     GABRIELLA CHAN : Hadii margarito ku hadasho Soomaali, waaxda luqadaha, qaybta kaalmada adeegyada, waxay orquideain manjeet marcos. So [facility] [phone].    ATENCIÓN: Si habla español, tiene a encinas disposición servicios gratuitos de asistencia lingüística. ame al [phone].    We comply with applicable federal civil rights laws and [place] laws. We do not discriminate on the basis of race, color, national origin, age, disability, sex, sexual orientation, or gender identity.            Thank you!     Thank you for choosing [facility]  for your care. Our goal is always to provide you with excellent care. Hearing back from our patients is one way we can continue to improve our services. Please  take a few minutes to complete the written survey that you may receive in the mail after your visit with us. Thank you!             Your Updated Medication List - Protect others around you: Learn how to safely use, store and throw away your medicines at www.disposemymeds.org.          This list is accurate as of 10/22/18 12:47 PM.  Always use your most recent med list.                   Brand Name Dispense Instructions for use Diagnosis    albuterol 108 (90 Base) MCG/ACT inhaler    PROAIR HFA/PROVENTIL HFA/VENTOLIN HFA    1 Inhaler    Inhale 2 puffs into the lungs every 6 hours as needed for shortness of breath / dyspnea or wheezing    Cough       amoxicillin-clavulanate 875-125 MG per tablet    AUGMENTIN    20 tablet    Take 1 tablet by mouth 2 times daily for 10 days    Acute sinusitis with symptoms > 10 days, Lower respiratory infection       PRENATAL VITAMIN PO      Take 1 tablet by mouth daily        triamcinolone 0.1 % cream    KENALOG    15 g    Apply sparingly to affected area three times daily as needed.    Rash

## 2018-10-22 NOTE — PATIENT INSTRUCTIONS
Saline nose spray    Delsym at night    Please FOLLOW UP at primary care clinic if not improving, new symptoms, worse or this does not resolve.  [facility]  [phone]

## 2018-12-03 ENCOUNTER — MYC MEDICAL ADVICE (OUTPATIENT)
Dept: FAMILY MEDICINE | Facility: CLINIC | Age: 33
End: 2018-12-03

## 2018-12-04 NOTE — TELEPHONE ENCOUNTER
Can you would like to get her Pap smear done before the end of the year so I can try to do a wet prep and evaluate her ovaries too.  This would not need to be a full physical, we can just schedule it as pelvic pain and will do everything at once.  You can use 1 of my acute visits or Dr. only slot.    Electronically signed by:  Mello Nguyễn M.D.  12/3/2018

## 2018-12-06 ENCOUNTER — OFFICE VISIT (OUTPATIENT)
Dept: FAMILY MEDICINE | Facility: CLINIC | Age: 33
End: 2018-12-06
Payer: COMMERCIAL

## 2018-12-06 VITALS
DIASTOLIC BLOOD PRESSURE: 80 MMHG | SYSTOLIC BLOOD PRESSURE: 132 MMHG | WEIGHT: 191.6 LBS | RESPIRATION RATE: 16 BRPM | OXYGEN SATURATION: 99 % | BODY MASS INDEX: 30.93 KG/M2 | HEART RATE: 97 BPM | TEMPERATURE: 97.6 F

## 2018-12-06 DIAGNOSIS — Z01.411 ENCOUNTER FOR GYNECOLOGICAL EXAMINATION WITH ABNORMAL FINDING: Primary | ICD-10-CM

## 2018-12-06 DIAGNOSIS — N89.8 VAGINAL ODOR: ICD-10-CM

## 2018-12-06 DIAGNOSIS — Z23 NEED FOR PROPHYLACTIC VACCINATION AND INOCULATION AGAINST INFLUENZA: ICD-10-CM

## 2018-12-06 DIAGNOSIS — R10.2 FEMALE PELVIC PAIN: ICD-10-CM

## 2018-12-06 DIAGNOSIS — Z32.00 PREGNANCY EXAMINATION OR TEST, PREGNANCY UNCONFIRMED: ICD-10-CM

## 2018-12-06 LAB
HCG UR QL: NEGATIVE
SPECIMEN SOURCE: NORMAL
WET PREP SPEC: NORMAL

## 2018-12-06 PROCEDURE — G0123 SCREEN CERV/VAG THIN LAYER: HCPCS | Performed by: FAMILY MEDICINE

## 2018-12-06 PROCEDURE — 81025 URINE PREGNANCY TEST: CPT | Performed by: FAMILY MEDICINE

## 2018-12-06 PROCEDURE — 87210 SMEAR WET MOUNT SALINE/INK: CPT | Performed by: FAMILY MEDICINE

## 2018-12-06 PROCEDURE — 87624 HPV HI-RISK TYP POOLED RSLT: CPT | Performed by: FAMILY MEDICINE

## 2018-12-06 PROCEDURE — 99214 OFFICE O/P EST MOD 30 MIN: CPT | Mod: 25 | Performed by: FAMILY MEDICINE

## 2018-12-06 PROCEDURE — 90471 IMMUNIZATION ADMIN: CPT | Performed by: FAMILY MEDICINE

## 2018-12-06 PROCEDURE — 90686 IIV4 VACC NO PRSV 0.5 ML IM: CPT | Performed by: FAMILY MEDICINE

## 2018-12-06 ASSESSMENT — PAIN SCALES - GENERAL: PAINLEVEL: NO PAIN (0)

## 2018-12-06 NOTE — NURSING NOTE
Prior to injection verified patient identity using patient's name and date of birth.   Patient instructed to remain in clinic for 20 minutes afterwards, and to report any adverse reaction to me immediately.  Mel Mesa MA

## 2018-12-06 NOTE — PROGRESS NOTES
SUBJECTIVE:   Amy Moore is a 33 year old female who presents to clinic today for the following health issues:    Pelvic discomfort for the past week, but having a bad vaginal odor particularly after sex for the past few weeks. She has had ovarian cysts and now has had some bilateral pelvic discomfort.  Her periods have been very anywhere from 28-35 days.  In the past she has done some ovulation predictor testing and has had positive ovulation.  She did do this this past month and ovulated on day 15.  She and her  had intercourse on day 10, 13, 14,15 and 17.  She is a couple days late with her menses so is not sure if she is pregnant or not.  She does not feel pregnant.  She is a little frustrated because they have not had unprotected intercourse over the past 8-10 months and has not had a pregnancy.  She tends to be a little bit of a worrier and is wondering if is anything to do with her or her .  Because they have had one successful pregnancy I do not think there is a concern for infertility at this point and especially since she has had a positive ovulation we know that she is  ovulating but it may just be the timing is not right for pregnancy.  There is only about 20% chance each cycle that the patient will get pregnant even when they are trying.      The patient will be due for a Pap smear this coming month since we are going to do a pelvic exam today we will get that out of the way for her also.      Problem list and histories reviewed & adjusted, as indicated.  Additional history: as documented    Patient Active Problem List   Diagnosis     Slow transit constipation     Weight gain     Pelvic pain in female     Supervision of normal first pregnancy     Encounter for triage in pregnant patient     Normal labor     S/P  section     LORI (generalized anxiety disorder)     Adjustment disorder with depressed mood     Past Surgical History:   Procedure Laterality Date      SECTION  N/A 2016    Procedure:  SECTION;  Surgeon: Merrick Justice MD;  Location:  L+D     NO HISTORY OF SURGERY         Social History   Substance Use Topics     Smoking status: Former Smoker     Smokeless tobacco: Never Used      Comment: socially in early      Alcohol use No      Comment: socially     Family History   Problem Relation Age of Onset     Hypothyroidism Maternal Grandmother      Thyroid Disease Maternal Grandmother      Myocardial Infarction Paternal Grandfather 40     Hypothyroidism Mother      Hypertension Mother      Thyroid Disease Mother      Hypothyroidism Sister      Thyroid Disease Sister      Hypothyroidism Sister      Thyroid Disease Sister          Current Outpatient Prescriptions   Medication Sig Dispense Refill     Prenatal Vit-Fe Fumarate-FA (PRENATAL VITAMIN PO) Take 1 tablet by mouth daily       No Known Allergies  Recent Labs   Lab Test  16   0943   LDL  93   HDL  61   TRIG  64   TSH  2.58      BP Readings from Last 3 Encounters:   18 132/80   10/22/18 116/76   18 118/64    Wt Readings from Last 3 Encounters:   18 191 lb 9.6 oz (86.9 kg)   18 196 lb 9.6 oz (89.2 kg)   02/15/18 195 lb (88.5 kg)                  Labs reviewed in EPIC    Reviewed and updated as needed this visit by clinical staff  Tobacco  Allergies  Meds  Med Hx  Surg Hx  Fam Hx  Soc Hx      Reviewed and updated as needed this visit by Provider         ROS:  Constitutional, HEENT, cardiovascular, pulmonary, gi and gu systems are negative, except as otherwise noted.    OBJECTIVE:     /80 (BP Location: Left arm, Patient Position: Chair, Cuff Size: Adult Regular)  Pulse 97  Temp 97.6  F (36.4  C) (Temporal)  Resp 16  Wt 191 lb 9.6 oz (86.9 kg)  LMP 2018  SpO2 99%  BMI 30.93 kg/m2  Body mass index is 30.93 kg/(m^2).  GENERAL: healthy, alert and no distress  ABDOMEN: Bowel sounds are present throughout.  She has some discomfort in the right and left lower  abdominal region to deep palpation but I do not feel any masses or any hepatosplenomegaly.  She is no rebound or guarding.  She is a little tender over the suprapubic region also.   (female): normal female external genitalia, normal urethral meatus , vaginal mucosa pink, moist, well rugated, normal cervix, nulliparous in appearance without discharge from the cervical loss.  We did obtain a Pap smear and she had just a little bit of bleeding after that but nothing concerning.  There is minimal discharge and I did sample this for a wet prep.  A bimanual exam showed some tenderness in the right adnexae, but no uterine tenderness or tenderness on the left adnexal region.      Diagnostic Test Results:  Results for orders placed or performed in visit on 12/06/18 (from the past 24 hour(s))   HCG qualitative urine   Result Value Ref Range    HCG Qual Urine Negative NEG^Negative   Wet prep   Result Value Ref Range    Specimen Description Vagina     Wet Prep Few  PMNs seen       Wet Prep No Trichomonas seen     Wet Prep No clue cells seen     Wet Prep No yeast seen        ASSESSMENT/PLAN:   (Z01.411) Encounter for gynecological examination with abnormal finding  (primary encounter diagnosis)  Comment: Patient with right-sided pelvic pain and a history of ovarian cysts.  Plan: Pap imaged thin layer screen with HPV -         recommended age 30 - 65 years (select HPV order        below), HPV High Risk Types DNA Cervical        Her wet prep today was unremarkable and her urine pregnancy was negative.  We will obtain a pelvic ultrasound to look for pelvic pathology particularly ovarian cysts.  She did ovulate this month so she may have a cyst from where she ovulated but in the past these have resolved on their own so we will address the findings once we have the ultrasound available.  We did discuss in detail about ovulation and when she should time her intercourse.  She is aware that the angle only survived for 24-30 hours  "after ovulation so it is important to have intercourse prior to ovulation and then the day of evaluation.  Early as she should have intercourse would be 5 days prior to ovulation and then as frequently as she and her  can plan up until the day of ovulation.    (N89.8) Vaginal odor  Comment: Her wet prep was negative and there was minimal discharge so I think the order that she has is just physiologic  Plan: Wet prep        .  Reassured her that this was normal physiologic discharge and that really is nothing that we need to treat at this time.    (R10.2) Female pelvic pain  Comment: History of ovarian cysts  Plan: US Pelvic Complete w Transvaginal        We will obtain a pelvic ultrasound to evaluate the uterus and ovaries.    (Z32.00) Pregnancy examination or test, pregnancy unconfirmed  Comment: She is late with her menses  Plan: HCG qualitative urine         pregnancy test today was negative    (Z23) Need for prophylactic vaccination and inoculation against influenza  Comment: Offer the patient a flu shot today  Plan: FLU VACCINE, SPLIT VIRUS, IM (QUADRIVALENT)         [93378]- >3 YRS, Vaccine Administration,         Initial [44818]        She accepted.       BP Screening:   Last 3 BP Readings:    BP Readings from Last 3 Encounters:   12/06/18 132/80   10/22/18 116/76   07/03/18 118/64       The following was recommended to the patient:  Re-screen BP within a year and recommended lifestyle modifications  Tobacco Cessation:   reports that she has quit smoking. She has never used smokeless tobacco.      BMI:   Estimated body mass index is 30.93 kg/(m^2) as calculated from the following:    Height as of 7/3/18: 5' 6\" (1.676 m).    Weight as of this encounter: 191 lb 9.6 oz (86.9 kg).   Weight management plan: Not addressed today.      Electronically signed by:  Mello Nguyễn M.D.  12/6/2018    "

## 2018-12-06 NOTE — MR AVS SNAPSHOT
After Visit Summary   12/6/2018    Amy Moore    MRN: [id number]           Patient Information     Date Of Birth          1985        Visit Information        Provider Department      12/6/2018 8:50 AM Mello Nguyễn MD [facility]        Today's Diagnoses     Pregnancy examination or test, pregnancy unconfirmed    -  1    Encounter for gynecological examination with abnormal finding        Vaginal odor        Female pelvic pain           Follow-ups after your visit        Your next 10 appointments already scheduled     Dec 14, 2018  8:30 AM CST   US PELVIC COMPLETE W TRANSVAGINAL with PHUS1   [facility] Ul[facility]ound ([facility])    [address] [place] [id number] [phone]           How do I prepare for my exam? (Food and drink instructions) Adults: Drink four 8-ounce glasses of fluid. Finish drinking an hour before your exam, so you have a full bladder. If you need to empty your bladder before your exam, try to release only a little urine. Then, drink another glass of fluid.  Children: * Children who are potty trained up to 6 years old should drink at least 2 cups (16 oz) of water/non-carbonated beverage 30 minutes prior to the exam. * Children who are 6-10 years should drink at least 3 cups (24 oz) of water/non-carbonated beverage 45 minutes prior to the exam. * Children who are 10 years or older should drink at least 4 cups (32 oz) of water/non-carbonated beverage 45 minutes prior to the exam.  If your child is very uncomfortable or has an urgent need to pee, please notify a technologist; they will try to find out how much longer the wait may be and provide instructions to help relieve the pressure.  What should I wear: Wear comfortable clothes.  How long does the exam take: Most ultrasounds take 30 to 60 minutes.  What should I bring: Bring a list of your medicines, including vitamins, minerals and over-the-counter drugs. It is  safest to leave personal items at home.  Do I need a :  No  is needed.  What do I need to tell my doctor: Tell your doctor about any allergies you may have.  What should I do after the exam: No restrictions, you may resume normal activities.  What is this test: An ultrasound uses sound waves to make pictures of the body. Sound waves do not cause pain. The only discomfort may be the pressure of the wand against your skin or full bladder.  Who should I call with questions: If you have any questions, please call the Imaging Department where you will have your exam. Directions, parking instructions, and other information are available on our website, [email or web address].            Jan 17, 2019  8:50 AM CST   PHYSICAL with Mello Nguyễn MD   [facility] ([facility])    [address] [id number] [phone]              Future tests that were ordered for you today     Open Future Orders        Priority Expected Expires Ordered    US Pelvic Complete w Transvaginal Routine  12/6/2019 12/6/2018            Who to contact     If you have questions or need follow up information about today's clinic visit or your schedule please contact [facility] directly at [phone].  Normal or non-critical lab and imaging results will be communicated to you by MyChart, letter or phone within 4 business days after the clinic has received the results. If you do not hear from us within 7 days, please contact the clinic through [employer] or phone. If you have a critical or abnormal lab result, we will notify you by phone as soon as possible.  Submit refill requests through [employer] or call your pharmacy and they will forward the refill request to us. Please allow 3 business days for your refill to be completed.          Additional Information About Your Visit        [employer] Information     [employer] gives you secure access to your electronic health record. If  you see a primary care provider, you can also send messages to your care team and make appointments. If you have questions, please call your primary care clinic.  If you do not have a primary care provider, please call [phone] and they will assist you.        Care EveryWhere ID     This is your Care EveryWhere ID. This could be used by other organizations to access your [facility] medical records  [id number]        Your Vitals Were     Pulse Temperature Respirations Last Period Pulse Oximetry BMI (Body Mass Index)    97 97.6  F (36.4  C) (Temporal) 16 11/08/2018 99% 30.93 kg/m2       Blood Pressure from Last 3 Encounters:   12/06/18 132/80   10/22/18 116/76   07/03/18 118/64    Weight from Last 3 Encounters:   12/06/18 191 lb 9.6 oz (86.9 kg)   07/03/18 196 lb 9.6 oz (89.2 kg)   02/15/18 195 lb (88.5 kg)              We Performed the Following     HCG qualitative urine     HPV High Risk Types DNA Cervical     Pap imaged thin layer screen with HPV - recommended age 30 - 65 years (select HPV order below)     Wet prep        Primary Care Provider Office Phone # Fax #    Mello Nguyễn [phone]       [address] [place] [address]        Equal Access to Services     GABRIELLA CHAN : Hadii margarito ku hadasho Soomaali, waaxda luqadaha, qaybta kaalmada adeegyada, grant mattson hayamena raman . So [facility] [phone].    ATENCIÓN: Si habla español, tiene a encinas disposición servicios gratuitos de asistencia lingüística. Llame al [phone].    We comply with applicable federal civil rights laws and [place] laws. We do not discriminate on the basis of race, color, national origin, age, disability, sex, sexual orientation, or gender identity.            Thank you!     Thank you for choosing [facility]  for your care. Our goal is always to provide you with excellent care. Hearing back from our patients is one way we can continue to improve our services. Please take a  few minutes to complete the written survey that you may receive in the mail after your visit with us. Thank you!             Your Updated Medication List - Protect others around you: Learn how to safely use, store and throw away your medicines at www.disposemymeds.org.          This list is accurate as of 12/6/18  9:33 AM.  Always use your most recent med list.                   Brand Name Dispense Instructions for use Diagnosis    PRENATAL VITAMIN PO      Take 1 tablet by mouth daily

## 2018-12-10 LAB
COPATH REPORT: NORMAL
PAP: NORMAL

## 2018-12-12 LAB
FINAL DIAGNOSIS: NORMAL
HPV HR 12 DNA CVX QL NAA+PROBE: NEGATIVE
HPV16 DNA SPEC QL NAA+PROBE: NEGATIVE
HPV18 DNA SPEC QL NAA+PROBE: NEGATIVE
SPECIMEN DESCRIPTION: NORMAL
SPECIMEN SOURCE CVX/VAG CYTO: NORMAL

## 2018-12-14 ENCOUNTER — HOSPITAL ENCOUNTER (OUTPATIENT)
Dept: ULTRASOUND IMAGING | Facility: CLINIC | Age: 33
Discharge: HOME | End: 2018-12-14
Attending: FAMILY MEDICINE | Admitting: FAMILY MEDICINE
Payer: COMMERCIAL

## 2018-12-14 DIAGNOSIS — R10.2 FEMALE PELVIC PAIN: ICD-10-CM

## 2018-12-14 PROCEDURE — 76830 TRANSVAGINAL US NON-OB: CPT

## 2019-01-09 ENCOUNTER — MYC MEDICAL ADVICE (OUTPATIENT)
Dept: FAMILY MEDICINE | Facility: CLINIC | Age: 34
End: 2019-01-09

## 2019-01-09 DIAGNOSIS — N97.9 FEMALE INFERTILITY: Primary | ICD-10-CM

## 2019-01-09 NOTE — TELEPHONE ENCOUNTER
The patient has been trying to get pregnant for over a year and has been unsuccessful.  She has been doing ovulation predictor testing and timed intercourse without successful pregnancy.  She would like to see an OB/GYN who specializes in infertility to discuss next options.    Electronically signed by:  Mello Nguyễn M.D.  1/9/2019

## 2019-01-16 ASSESSMENT — ENCOUNTER SYMPTOMS
CHILLS: 0
PARESTHESIAS: 0
MYALGIAS: 0
BREAST MASS: 0
NAUSEA: 0
CONSTIPATION: 1
ARTHRALGIAS: 0
ABDOMINAL PAIN: 0
EYE PAIN: 0
SORE THROAT: 0
DYSURIA: 0
HEARTBURN: 0
FREQUENCY: 0
NERVOUS/ANXIOUS: 1
HEADACHES: 0
JOINT SWELLING: 0
DIZZINESS: 0
WEAKNESS: 0
SHORTNESS OF BREATH: 0
HEMATURIA: 0
COUGH: 0
PALPITATIONS: 0
FEVER: 0
HEMATOCHEZIA: 0
DIARRHEA: 0

## 2019-01-17 ENCOUNTER — OFFICE VISIT (OUTPATIENT)
Dept: FAMILY MEDICINE | Facility: CLINIC | Age: 34
End: 2019-01-17
Payer: COMMERCIAL

## 2019-01-17 VITALS
HEIGHT: 67 IN | SYSTOLIC BLOOD PRESSURE: 118 MMHG | DIASTOLIC BLOOD PRESSURE: 80 MMHG | TEMPERATURE: 97.5 F | WEIGHT: 192 LBS | RESPIRATION RATE: 18 BRPM | BODY MASS INDEX: 30.13 KG/M2 | OXYGEN SATURATION: 99 % | HEART RATE: 88 BPM

## 2019-01-17 DIAGNOSIS — K59.01 SLOW TRANSIT CONSTIPATION: ICD-10-CM

## 2019-01-17 DIAGNOSIS — Z00.00 ROUTINE GENERAL MEDICAL EXAMINATION AT A HEALTH CARE FACILITY: Primary | ICD-10-CM

## 2019-01-17 DIAGNOSIS — Z13.220 LIPID SCREENING: ICD-10-CM

## 2019-01-17 DIAGNOSIS — K64.8 INTERNAL HEMORRHOIDS WITHOUT COMPLICATION: ICD-10-CM

## 2019-01-17 DIAGNOSIS — F41.1 GAD (GENERALIZED ANXIETY DISORDER): ICD-10-CM

## 2019-01-17 LAB
CHOLEST SERPL-MCNC: 153 MG/DL
HDLC SERPL-MCNC: 47 MG/DL
LDLC SERPL CALC-MCNC: 95 MG/DL
NONHDLC SERPL-MCNC: 106 MG/DL
TRIGL SERPL-MCNC: 55 MG/DL
TSH SERPL DL<=0.005 MIU/L-ACNC: 2.35 MU/L (ref 0.4–4)

## 2019-01-17 PROCEDURE — 99395 PREV VISIT EST AGE 18-39: CPT | Performed by: FAMILY MEDICINE

## 2019-01-17 PROCEDURE — 84443 ASSAY THYROID STIM HORMONE: CPT | Performed by: FAMILY MEDICINE

## 2019-01-17 PROCEDURE — 80061 LIPID PANEL: CPT | Performed by: FAMILY MEDICINE

## 2019-01-17 PROCEDURE — 36415 COLL VENOUS BLD VENIPUNCTURE: CPT | Performed by: FAMILY MEDICINE

## 2019-01-17 SDOH — HEALTH STABILITY: PHYSICAL HEALTH: ON AVERAGE, HOW MANY MINUTES DO YOU ENGAGE IN EXERCISE AT THIS LEVEL?: 40 MIN

## 2019-01-17 SDOH — ECONOMIC STABILITY: TRANSPORTATION INSECURITY
IN THE PAST 12 MONTHS, HAS LACK OF TRANSPORTATION KEPT YOU FROM MEETINGS, WORK, OR FROM GETTING THINGS NEEDED FOR DAILY LIVING?: NO

## 2019-01-17 SDOH — HEALTH STABILITY: MENTAL HEALTH
STRESS IS WHEN SOMEONE FEELS TENSE, NERVOUS, ANXIOUS, OR CAN'T SLEEP AT NIGHT BECAUSE THEIR MIND IS TROUBLED. HOW STRESSED ARE YOU?: NOT AT ALL

## 2019-01-17 SDOH — SOCIAL STABILITY: SOCIAL INSECURITY: WITHIN THE LAST YEAR, HAVE YOU BEEN HUMILIATED OR EMOTIONALLY ABUSED IN OTHER WAYS BY YOUR PARTNER OR EX-PARTNER?: NO

## 2019-01-17 SDOH — SOCIAL STABILITY: SOCIAL NETWORK: IN A TYPICAL WEEK, HOW MANY TIMES DO YOU TALK ON THE PHONE WITH FAMILY, FRIENDS, OR NEIGHBORS?: ONCE A WEEK

## 2019-01-17 SDOH — SOCIAL STABILITY: SOCIAL INSECURITY: WITHIN THE LAST YEAR, HAVE YOU BEEN AFRAID OF YOUR PARTNER OR EX-PARTNER?: NO

## 2019-01-17 SDOH — SOCIAL STABILITY: SOCIAL NETWORK: HOW OFTEN DO YOU GET TOGETHER WITH FRIENDS OR RELATIVES?: ONCE A WEEK

## 2019-01-17 SDOH — SOCIAL STABILITY: SOCIAL INSECURITY
WITHIN THE LAST YEAR, HAVE YOU BEEN KICKED, HIT, SLAPPED, OR OTHERWISE PHYSICALLY HURT BY YOUR PARTNER OR EX-PARTNER?: NO

## 2019-01-17 SDOH — ECONOMIC STABILITY: FOOD INSECURITY: WITHIN THE PAST 12 MONTHS, THE FOOD YOU BOUGHT JUST DIDN'T LAST AND YOU DIDN'T HAVE MONEY TO GET MORE.: NEVER TRUE

## 2019-01-17 SDOH — ECONOMIC STABILITY: INCOME INSECURITY: HOW HARD IS IT FOR YOU TO PAY FOR THE VERY BASICS LIKE FOOD, HOUSING, MEDICAL CARE, AND HEATING?: NOT HARD AT ALL

## 2019-01-17 SDOH — ECONOMIC STABILITY: FOOD INSECURITY: WITHIN THE PAST 12 MONTHS, YOU WORRIED THAT YOUR FOOD WOULD RUN OUT BEFORE YOU GOT MONEY TO BUY MORE.: NEVER TRUE

## 2019-01-17 SDOH — SOCIAL STABILITY: SOCIAL NETWORK: HOW OFTEN DO YOU ATTEND CHURCH OR RELIGIOUS SERVICES?: 1 TO 4 TIMES PER YEAR

## 2019-01-17 SDOH — ECONOMIC STABILITY: TRANSPORTATION INSECURITY
IN THE PAST 12 MONTHS, HAS THE LACK OF TRANSPORTATION KEPT YOU FROM MEDICAL APPOINTMENTS OR FROM GETTING MEDICATIONS?: NO

## 2019-01-17 SDOH — SOCIAL STABILITY: SOCIAL NETWORK: HOW OFTEN DO YOU ATTENT MEETINGS OF THE CLUB OR ORGANIZATION YOU BELONG TO?: NEVER

## 2019-01-17 SDOH — HEALTH STABILITY: PHYSICAL HEALTH: ON AVERAGE, HOW MANY DAYS PER WEEK DO YOU ENGAGE IN MODERATE TO STRENUOUS EXERCISE (LIKE A BRISK WALK)?: 2 DAYS

## 2019-01-17 SDOH — SOCIAL STABILITY: SOCIAL NETWORK: ARE YOU MARRIED, WIDOWED, DIVORCED, SEPARATED, NEVER MARRIED, OR LIVING WITH A PARTNER?: MARRIED

## 2019-01-17 SDOH — SOCIAL STABILITY: SOCIAL NETWORK
DO YOU BELONG TO ANY CLUBS OR ORGANIZATIONS SUCH AS CHURCH GROUPS UNIONS, FRATERNAL OR ATHLETIC GROUPS, OR SCHOOL GROUPS?: NO

## 2019-01-17 SDOH — SOCIAL STABILITY: SOCIAL INSECURITY
WITHIN THE LAST YEAR, HAVE TO BEEN RAPED OR FORCED TO HAVE ANY KIND OF SEXUAL ACTIVITY BY YOUR PARTNER OR EX-PARTNER?: NO

## 2019-01-17 ASSESSMENT — ENCOUNTER SYMPTOMS
CONSTIPATION: 1
ABDOMINAL PAIN: 0
FEVER: 0
HEMATOCHEZIA: 0
DIZZINESS: 0
PARESTHESIAS: 0
JOINT SWELLING: 0
SHORTNESS OF BREATH: 0
WEAKNESS: 0
CHILLS: 0
MYALGIAS: 0
DIARRHEA: 0
COUGH: 0
BREAST MASS: 0
HEARTBURN: 0
EYE PAIN: 0
DYSURIA: 0
HEADACHES: 0
HEMATURIA: 0
ARTHRALGIAS: 0
PALPITATIONS: 0
FREQUENCY: 0
NERVOUS/ANXIOUS: 1
NAUSEA: 0
SORE THROAT: 0

## 2019-01-17 ASSESSMENT — PAIN SCALES - GENERAL: PAINLEVEL: NO PAIN (0)

## 2019-01-17 ASSESSMENT — MIFFLIN-ST. JEOR: SCORE: 1601.95

## 2019-01-17 NOTE — PROGRESS NOTES
SUBJECTIVE:   CC: Amy Moore is an 33 year old woman who presents for preventive health visit.     Physical   Annual:     Getting at least 3 servings of Calcium per day:  Yes    Bi-annual eye exam:  NO    Dental care twice a year:  Yes    Sleep apnea or symptoms of sleep apnea:  None    Diet:  Regular (no restrictions)    Frequency of exercise:  2-3 days/week    Duration of exercise:  30-45 minutes    Taking medications regularly:  Yes    Medication side effects:  None    Additional concerns today:  No    PHQ-2 Total Score: 0          PROBLEMS TO ADD ON...   is having some intimacy issues, not able to perform in the bedroom, not able to achieve or maintain his erections.     Today's PHQ-2 Score:   PHQ-2 ( 1999 Pf[employer]) 1/16/2019   Q1: Little interest or pleasure in doing things 0   Q2: Feeling down, depressed or hopeless 0   PHQ-2 Score 0   Q1: Little interest or pleasure in doing things Not at all   Q2: Feeling down, depressed or hopeless Not at all   PHQ-2 Score 0       Abuse: Current or Past(Physical, Sexual or Emotional)- No  Do you feel safe in your environment? Yes    Social History     Tobacco Use     Smoking status: Former Smoker     Smokeless tobacco: Never Used     Tobacco comment: socially in early 20's   Substance Use Topics     Alcohol use: No     Alcohol/week: 0.0 oz     Comment: socially     Alcohol Use 1/16/2019   If you drink alcohol do you typically have greater than 3 drinks per day OR greater than 7 drinks per week? No   No flowsheet data found.    Reviewed orders with patient.  Reviewed health maintenance and updated orders accordingly - Yes  Labs reviewed in EPIC    Mammogram not appropriate for this patient based on age.    Pertinent mammograms are reviewed under the imaging tab.  History of abnormal Pap smear: NO - age 30-65 PAP every 5 years with negative HPV co-testing recommended  PAP / HPV Latest Ref Rng & Units 12/6/2018 1/12/2016   PAP - NIL NIL   HPV 16 DNA NEG:Negative  Negative Negative   HPV 18 DNA NEG:Negative Negative Negative   OTHER HR HPV NEG:Negative Negative Negative     Reviewed and updated as needed this visit by clinical staff  Tobacco  Allergies  Meds         Reviewed and updated as needed this visit by Provider        Past Medical History:   Diagnosis Date     Pelvic pain in female 2016     Slow transit constipation 2016     Weight gain 2016      Past Surgical History:   Procedure Laterality Date      SECTION N/A 2016    Procedure:  SECTION;  Surgeon: Merrick Justice MD;  Location:  L+D     NO HISTORY OF SURGERY       Obstetric History       T0      L1     SAB0   TAB0   Ectopic0   Multiple0   Live Births1       # Outcome Date GA Lbr Brennen/2nd Weight Sex Delivery Anes PTL Lv   1  16 36w6d  3.02 kg (6 lb 10.5 oz) M CS-LTranv EPI  WARREN      Name: HOMAR KOROMA      Apgar1:  9                Apgar5: 9      Obstetric Comments   EDC 2016  Based on LMP.   to Ketan.       Review of Systems   Constitutional: Negative for chills and fever.   HENT: Negative for congestion, ear pain, hearing loss and sore throat.    Eyes: Negative for pain and visual disturbance.   Respiratory: Negative for cough and shortness of breath.    Cardiovascular: Negative for chest pain, palpitations and peripheral edema.   Gastrointestinal: Positive for constipation. Negative for abdominal pain, diarrhea, heartburn, hematochezia and nausea.   Breasts:  Negative for tenderness, breast mass and discharge.   Genitourinary: Negative for dysuria, frequency, genital sores, hematuria, pelvic pain, urgency, vaginal bleeding and vaginal discharge.   Musculoskeletal: Negative for arthralgias, joint swelling and myalgias.   Skin: Negative for rash.   Neurological: Negative for dizziness, weakness, headaches and paresthesias.   Psychiatric/Behavioral: Negative for mood changes. The patient is nervous/anxious.      CONSTITUTIONAL:  "NEGATIVE for fever, chills, change in weight  INTEGUMENTARU/SKIN: sun spot under the left eye that is more sensitive to sunburn  EYES: NEGATIVE for vision changes or irritation  ENT: NEGATIVE for ear, mouth and throat problems  RESP: NEGATIVE for significant cough or SOB  BREAST: NEGATIVE for masses, tenderness or discharge  CV: NEGATIVE for chest pain, palpitations or peripheral edema  GI: POSITIVE for constipation and some discomfort with BMs  : NEGATIVE for unusual urinary or vaginal symptoms. Periods are regular.  MUSCULOSKELETAL: NEGATIVE for significant arthralgias or myalgia  NEURO: NEGATIVE for weakness, dizziness or paresthesias  PSYCHIATRIC: NEGATIVE for changes in mood or affect     OBJECTIVE:   /80   Pulse 88   Temp 97.5  F (36.4  C) (Temporal)   Resp 18   Ht 1.699 m (5' 6.9\")   Wt 87.1 kg (192 lb)   SpO2 99%   BMI 30.16 kg/m    Physical Exam  GENERAL: healthy, alert and no distress  EYES: Eyes grossly normal to inspection, PERRL and conjunctivae and sclerae normal  HENT: ear canals and TM's normal, nose and mouth without ulcers or lesions  NECK: no adenopathy, no asymmetry, masses, or scars and thyroid normal to palpation  RESP: lungs clear to auscultation - no rales, rhonchi or wheezes  BREAST: No breast exam done, we discussed self breast awareness and what to be concerned about, ie; retraction of a nipple, dimpling of the skin or any nipple discharge or aerolar rash that does not clear.  CV: regular rate and rhythm, normal S1 S2, no S3 or S4, no murmur, click or rub, no peripheral edema and peripheral pulses strong  ABDOMEN: soft, nontender, no hepatosplenomegaly, no masses and bowel sounds normal   (female): exam was done back in December when she was in for other female complaints  Rectal: She has a small 8-10 mm hemorrhoidal tag at the 6 o'clock position and as I do a digital rectal exam this extends up along the anal sphincter to just above the dentate line.  It looks like an " internal hemorrhoid that extends to an external hemorrhoidal tag without any complications at this time.  MS: no gross musculoskeletal defects noted, no edema  SKIN: no suspicious lesions or rashes  NEURO: Normal strength and tone, mentation intact and speech normal  PSYCH: mentation appears normal, affect normal/bright    Diagnostic Test Results:  Labs pending today.    ASSESSMENT/PLAN:   (Z00.00) Routine general medical examination at a health care facility  (primary encounter diagnosis)  Comment: Overall doing well, she is a little stressed about her inability to get pregnant.  She continues to have some issues with constipation and now a new hemorrhoid.  Plan: See below    (F41.1) LORI (generalized anxiety disorder)  Comment: Her anxiety seems better today but she feels like it is more intense especially with the lack of getting pregnant issue.  Plan: TSH with free T4 reflex        We did recheck her TSH today and that is stable.  She does not want to be on anything for her anxiety at this time and feels that she can do with it pretty well.  Does not interfere with daily activities or her work.    (K59.01) Slow transit constipation  Comment: She has had to use MiraLAX in the past and will consider restarting that.  Plan: TSH with free T4 reflex        I think it is a good idea to get back on the daily dose of MiraLAX so that she can have more regular softer stools and that will help with her hemorrhoid issue as noted below.    (Z13.220) Lipid screening  Comment: She is due for lipids and is fasting today  Plan: Lipid panel reflex to direct LDL Fasting        These are normal with a little drop in her HDLs but that is probably due to the fact that she is not exercising as frequently.    (K64.8) Internal hemorrhoids without complication  Comment: Small internal hemorrhoid that extends to his external hemorrhoidal tag at the 6 o'clock position.  No evidence of any rectal masses or other concerns.  There is no blood  "on my glove.  Plan: Reassured her that this is just a simple internal hemorrhoid and no other concerns are noted.  She will restart her MiraLAX if she starts having more regular bowel movements.       COUNSELING:  Reviewed preventive health counseling, as reflected in patient instructions       Regular exercise       Healthy diet/nutrition       Vision screening       Alcohol Use       Family planning    BP Readings from Last 1 Encounters:   01/17/19 118/80     Estimated body mass index is 30.16 kg/m  as calculated from the following:    Height as of this encounter: 1.699 m (5' 6.9\").    Weight as of this encounter: 87.1 kg (192 lb).      Weight management plan: Discussed this briefly as far as decreasing her portions and increasing her activity levels.     reports that she has quit smoking. she has never used smokeless tobacco.      Counseling Resources:  ATP IV Guidelines  Pooled Cohorts Equation Calculator  Breast Cancer Risk Calculator  FRAX Risk Assessment  ICSI Preventive Guidelines  Dietary Guidelines for Americans, 2010  USDA's MyPlate  ASA Prophylaxis  Lung CA Screening    Electronically signed by:  Mello Nguyễn M.D.  1/17/2019    "

## 2019-03-18 ENCOUNTER — MYC MEDICAL ADVICE (OUTPATIENT)
Dept: FAMILY MEDICINE | Facility: CLINIC | Age: 34
End: 2019-03-18

## 2019-04-04 ENCOUNTER — MYC MEDICAL ADVICE (OUTPATIENT)
Dept: FAMILY MEDICINE | Facility: CLINIC | Age: 34
End: 2019-04-04

## 2019-10-07 ENCOUNTER — TELEPHONE (OUTPATIENT)
Dept: FAMILY MEDICINE | Facility: CLINIC | Age: 34
End: 2019-10-07

## 2019-10-07 NOTE — TELEPHONE ENCOUNTER
Pt called back. She had pregnancy test done at [facility], the record will be sent. She is scheduled with OB intake on 10/21. She would like to see AF the week of 10/28. Please advise on work appt day/ time.  Thank you,  Kayleen Gleason- Patient Representative

## 2019-10-07 NOTE — TELEPHONE ENCOUNTER
Reason for Call:  Same Day Appointment, Requested Provider:  Mello Nguyễn MD    PCP: Mello Nguyễn    Reason for visit: new ob     Duration of symptoms: NA     Have you been treated for this in the past? Yes    Additional comments: Pt is hoping you can work her in the week of 10/28.     Can we leave a detailed message on this number? YES    Phone number patient can be reached at: Home number on file [phone] (home)    Best Time: any     Call taken on 10/7/2019 at 4:54 PM by Kayleen Gleason

## 2019-10-07 NOTE — TELEPHONE ENCOUNTER
Called patient and left message to call the clinic back.  MJP/MA    She needs a confirmation pregnancy test and needs a 1st OB appt with .  MP/MA

## 2019-10-08 NOTE — TELEPHONE ENCOUNTER
10/30/2019 at 9:40 for a 1st OB appt.      Called patient and left message to call the clinic back.  MARY/MA

## 2019-10-21 ENCOUNTER — PRENATAL OFFICE VISIT (OUTPATIENT)
Dept: FAMILY MEDICINE | Facility: CLINIC | Age: 34
End: 2019-10-21
Payer: COMMERCIAL

## 2019-10-21 VITALS — HEIGHT: 68 IN | BODY MASS INDEX: 30.01 KG/M2 | WEIGHT: 198 LBS

## 2019-10-21 DIAGNOSIS — Z34.90 SUPERVISION OF NORMAL PREGNANCY: Primary | ICD-10-CM

## 2019-10-21 LAB
ABO + RH BLD: NORMAL
ABO + RH BLD: NORMAL
BLD GP AB SCN SERPL QL: NORMAL
BLOOD BANK CMNT PATIENT-IMP: NORMAL
ERYTHROCYTE [DISTWIDTH] IN BLOOD BY AUTOMATED COUNT: 11.8 % (ref 10–15)
HCG UR QL: POSITIVE
HCT VFR BLD AUTO: 37.2 % (ref 35–47)
HGB BLD-MCNC: 12.7 G/DL (ref 11.7–15.7)
MCH RBC QN AUTO: 30 PG (ref 26.5–33)
MCHC RBC AUTO-ENTMCNC: 34.1 G/DL (ref 31.5–36.5)
MCV RBC AUTO: 88 FL (ref 78–100)
PLATELET # BLD AUTO: 330 10E9/L (ref 150–450)
RBC # BLD AUTO: 4.23 10E12/L (ref 3.8–5.2)
SPECIMEN EXP DATE BLD: NORMAL
WBC # BLD AUTO: 7.4 10E9/L (ref 4–11)

## 2019-10-21 PROCEDURE — 87340 HEPATITIS B SURFACE AG IA: CPT | Performed by: FAMILY MEDICINE

## 2019-10-21 PROCEDURE — 86780 TREPONEMA PALLIDUM: CPT | Performed by: FAMILY MEDICINE

## 2019-10-21 PROCEDURE — 99207 ZZC NO CHARGE NURSE ONLY: CPT

## 2019-10-21 PROCEDURE — 85027 COMPLETE CBC AUTOMATED: CPT | Performed by: FAMILY MEDICINE

## 2019-10-21 PROCEDURE — 86850 RBC ANTIBODY SCREEN: CPT | Performed by: FAMILY MEDICINE

## 2019-10-21 PROCEDURE — 36415 COLL VENOUS BLD VENIPUNCTURE: CPT | Performed by: FAMILY MEDICINE

## 2019-10-21 PROCEDURE — 86901 BLOOD TYPING SEROLOGIC RH(D): CPT | Performed by: FAMILY MEDICINE

## 2019-10-21 PROCEDURE — 87389 HIV-1 AG W/HIV-1&-2 AB AG IA: CPT | Performed by: FAMILY MEDICINE

## 2019-10-21 PROCEDURE — 86900 BLOOD TYPING SEROLOGIC ABO: CPT | Performed by: FAMILY MEDICINE

## 2019-10-21 PROCEDURE — 86762 RUBELLA ANTIBODY: CPT | Performed by: FAMILY MEDICINE

## 2019-10-21 PROCEDURE — 87086 URINE CULTURE/COLONY COUNT: CPT | Performed by: FAMILY MEDICINE

## 2019-10-21 PROCEDURE — 81025 URINE PREGNANCY TEST: CPT | Performed by: FAMILY MEDICINE

## 2019-10-21 RX ORDER — ESTRADIOL 2 MG/1
2 TABLET ORAL DAILY
COMMUNITY
End: 2019-12-10

## 2019-10-21 ASSESSMENT — MIFFLIN-ST. JEOR: SCORE: 1641.62

## 2019-10-22 LAB
BACTERIA SPEC CULT: NORMAL
HBV SURFACE AG SERPL QL IA: NONREACTIVE
HIV 1+2 AB+HIV1 P24 AG SERPL QL IA: NONREACTIVE
Lab: NORMAL
SPECIMEN SOURCE: NORMAL

## 2019-10-23 LAB
RUBV IGG SERPL IA-ACNC: 32 IU/ML
T PALLIDUM AB SER QL: NONREACTIVE

## 2019-10-25 ENCOUNTER — HOSPITAL ENCOUNTER (OUTPATIENT)
Dept: ULTRASOUND IMAGING | Facility: CLINIC | Age: 34
Discharge: HOME | End: 2019-10-25
Attending: FAMILY MEDICINE | Admitting: FAMILY MEDICINE
Payer: COMMERCIAL

## 2019-10-25 DIAGNOSIS — Z34.90 SUPERVISION OF NORMAL PREGNANCY: ICD-10-CM

## 2019-10-25 PROCEDURE — 76801 OB US < 14 WKS SINGLE FETUS: CPT

## 2019-10-30 ENCOUNTER — MYC MEDICAL ADVICE (OUTPATIENT)
Dept: FAMILY MEDICINE | Facility: CLINIC | Age: 34
End: 2019-10-30

## 2019-10-30 ENCOUNTER — PRENATAL OFFICE VISIT (OUTPATIENT)
Dept: FAMILY MEDICINE | Facility: CLINIC | Age: 34
End: 2019-10-30
Payer: COMMERCIAL

## 2019-10-30 VITALS
SYSTOLIC BLOOD PRESSURE: 110 MMHG | HEART RATE: 74 BPM | DIASTOLIC BLOOD PRESSURE: 74 MMHG | OXYGEN SATURATION: 100 % | TEMPERATURE: 97.7 F | WEIGHT: 202 LBS | BODY MASS INDEX: 30.71 KG/M2 | RESPIRATION RATE: 18 BRPM

## 2019-10-30 DIAGNOSIS — O34.211 MATERNAL CARE DUE TO LOW TRANSVERSE UTERINE SCAR FROM PREVIOUS CESAREAN DELIVERY: ICD-10-CM

## 2019-10-30 DIAGNOSIS — O09.91 HIGH-RISK PREGNANCY IN FIRST TRIMESTER: ICD-10-CM

## 2019-10-30 DIAGNOSIS — O09.521 MULTIGRAVIDA OF ADVANCED MATERNAL AGE IN FIRST TRIMESTER: ICD-10-CM

## 2019-10-30 DIAGNOSIS — Z23 NEED FOR PROPHYLACTIC VACCINATION AND INOCULATION AGAINST INFLUENZA: ICD-10-CM

## 2019-10-30 PROBLEM — O09.90 HIGH-RISK PREGNANCY: Status: ACTIVE | Noted: 2019-10-30

## 2019-10-30 PROBLEM — O09.529 AMA (ADVANCED MATERNAL AGE) MULTIGRAVIDA 35+: Status: ACTIVE | Noted: 2019-10-30

## 2019-10-30 PROCEDURE — 90686 IIV4 VACC NO PRSV 0.5 ML IM: CPT | Performed by: FAMILY MEDICINE

## 2019-10-30 PROCEDURE — 90471 IMMUNIZATION ADMIN: CPT | Performed by: FAMILY MEDICINE

## 2019-10-30 PROCEDURE — 99207 ZZC FIRST OB VISIT: CPT | Mod: 25 | Performed by: FAMILY MEDICINE

## 2019-10-30 ASSESSMENT — PAIN SCALES - GENERAL: PAINLEVEL: MODERATE PAIN (5)

## 2019-10-30 NOTE — PROGRESS NOTES
INITIAL OB ASSESSMENT                     Date: 10/30/2019  Age: 34 year old   Plan Number: [id number]  Name: Amy Moore    : 1985  Phone: [phone] (home)  Marital Status:,    Race/Ethnicity:    PC Provider:  Mello Nguyễn    Taoist: Kevin  OB Physician:     Partner's Name: Ketan     Partner's Occupation:  Sales    OB CNM/NP/PA:         See Specialty History for details.    LMP: NA, Cycle length:  days  LMP Certain?:Yes  LMP Normal?: Yes     No Known Allergies     Current Outpatient Medications   Medication Sig Dispense Refill     estradiol (ESTRACE) 2 MG tablet Take 2 mg by mouth daily       Prenatal Vit-Fe Fumarate-FA (PRENATAL VITAMIN PO) Take 1 tablet by mouth daily       Social History: Benign, No substance abuse, environmental exposures, mental health risks and No financial concerns,pets. Partner support.     Past Medical History of Father of Baby:   Had some issues with sperm morphology and motility so had sperm concentration done with IUI to get his wife pregnant this time.      Past Medical History of Patient:  Past Medical History:   Diagnosis Date     Pelvic pain in female 2016     Slow transit constipation 2016     Weight gain 2016     Surgical History:       Past Surgical History:   Procedure Laterality Date      SECTION N/A 2016    Procedure:  SECTION;  Surgeon: Merrick Justice MD;  [facility]: [facility]     Family History:  Family History   Problem Relation Age of Onset     Hypothyroidism Maternal Grandmother      Thyroid Disease Maternal Grandmother      No Known Problems Maternal Grandfather      No Known Problems Paternal Grandmother      Myocardial Infarction Paternal Grandfather 40     Hypothyroidism Mother      Hypertension Mother      Thyroid Disease Mother      No Known Problems Father      Hypothyroidism Sister      Thyroid Disease Sister      Hypothyroidism Sister      Thyroid Disease Sister      No Known  Problems Son      Genetic History: No known genectic disease in 1st or 2nd degree relatives and FOB's has a cousin who has Down's syndrome.     Review of Systems: General: Benign and Pre-pregnancy weight 198#;  Cardiovascular:Benign,    Respiratory: Benign;  Gastrointestinal Benign;  Genitourinary: Benign;  Neuromuscular: Benign;   Endocrine: Benign;  Dermatologic Benign;   Psychiatric: Benign.     History Since Last Menstrual Period: has some low back pain that is not new.      Physical Exam: General: Well developed, well nourished female;   Skin: Normal;   HEENT: PERRLA, EOMI, fundi benign;   Neck: Supple, no adenopathy and thyroid normal;   Chest: Clear to auscultation;   Heart: Regular rate, rhythm and No murmur, rub, gallop;  Breasts: Not examined;   Abdomen: Benign, Soft, flat, non-tender, No masses, organomegaly, No inguinal nodes, Bowel sounds normoactive, old CS scar    Extremities: Normal and No edema;  Neurological: Normal;   Genital: not done, no pap smear due, and planning on a repeat LTCS  Bony Pelvis: not done.   Pelvimetry: not indicated     Assessment:   IUP at 9 2/7 wks gestation  Dating is excellent due to IUI and a confirming early US  Previous uterine scar  Geriatric pregnancy     Plan:  Follow up in 4 weeks.  Normal exercise.  Normal sexual activity.  Prenatal vitamins.  Anticipated weight gain.  Since patient is already over age 35 I did talk to her about her increased risk for chromosomal abnormalities and the genetic risks involved with advanced maternal age.  She would be interested in seeing the genetic counselor and talking with maternal-fetal medicine about her options for screening.  She is certain she would not do an amniocentesis but would be interested in a level 2 ultrasound, AFP quad screening or other non-invasive screening (Verify).  Since technically she did deliver premature at 36-6/7 weeks gestation she would be a candidate for progesterone injections starting at 17 weeks  gestation.  I really do not think that because she was so near-term that this would be something that I would push for her, but she wants to talk to maternal-fetal medicine about that and get their input on this also.  There was a new paper that came out recently that the FDA is withdrawing treatment recommendations for treatment for  birth due to lack of supporting evidence that it is beneficial, so I will ask M to address this with her also.    Electronically signed by:  Mello Nguyễn M.D.  10/30/2019

## 2019-10-30 NOTE — PATIENT INSTRUCTIONS
BIRTH AND 17-alpha hydroxyprogesterone caproate (17P) TREATMENT    What is  birth?      birth is the delivery of a baby before 37 weeks of gestation.  Approximately 1 in every 12 babies in [place] is born premature (that s approximately 6,000 babies every year)!     birth is often unexpected, but can happen for many reasons. There are some known risk factors, which include:   -pregnancy with twins or triplets   -being  race  -some problems with the uterus or cervix   -some medical problems like high blood pressure   -smoking, drinking, or using illegal drugs while pregnant   -infections like urinary tract infection, bacterial vaginosis and chlamydia while    pregnant  -depression, stress, and anxiety    But the biggest risk factor is having a previous  baby. In fact, women who have one  birth have a 30-50% chance of their next baby coming early too.     What are the risks to a baby that delivers prematurely?     birth is the number one cause of  death worldwide. This risk increases the earlier the baby is born.  Prematurity can also cause serious long term health problems for babies such as breathing problems, infections, bleeding into the brain, as well as long term developmental problems like cerebral palsy, learning impairments, hearing and vision problems.    How can I prevent  birth in my pregnancy?  Overall, the best thing to prevent  delivery is to stay healthy during your pregnancy, and follow up with your doctor for your regular visits and screening tests.  If you have a history of  birth in one of your past pregnancies, you may benefit from weekly injections of 17P.     What is 17P?  The full name is 17-alpha hydroxyprogesterone caproate. This is a form of your body s natural  pregnancy hormone , progesterone. The brand name of this is Lis, and it is FDA approved for prevention of  birth.  This medication  is given in once weekly injections from week 16-20 through the 36th week of pregnancy. Research shows that women taking 17P can reduce their risk of  birth from 50% to 36%. The treatment has also been shown to reduce the risk of complications after birth, such as bleeding in the brain and need for supplemental oxygen.    It is important to complete the treatment once you start, as the risk of  birth goes up if you stop the injections early.    How can I get started on the Lis treatment?  First, you should talk with your doctor to find out if this is a good option for you.  It is safe for pregnant women and for the fetus, but if you have some medical problems like uncontrolled blood pressure, breast cancer, or liver disease you should talk about it with your doctor first.  Your clinic will work with you to help determine insurance coverage and preauthorization if needed.  Then you will schedule weekly visits for 17P injections in addition to your routine prenatal visits with your doctor.    Side Effects of [place]  The most common side effects  reported by [place] users are   injection site reactions (pain [35%], swelling  [17%], pruritus (itching) [6%], nodule [5%]), urticaria (rash) (12%), pruritus (generalized itching (8%), nausea (6%), and diarrhea (2%).

## 2019-10-30 NOTE — PROGRESS NOTES
"  Evaluation for 17P   Is this current pregnancy a sinclair pregnancy? Yes  Has this patient experienced a spontaneous,  birth or  rupture of membranes between 20 - 37 weeks of a sinclair pregnancy? Yes    Both answers are \"Yes\" the patient may be a candidate for \"17P\" [place].        Assessment:  Amy is a 34 year old  at 9w2d with a history of prior spontaneous sinclair  birth.  Given this history, Amy is at risk for recurrent  birth in this pregnancy.  I discussed the availability of 17-alpha hydroxyprogesterone caproate (17P), which has been demonstrated to reduce the incidence of recurrent  birth and Amy does meet criteria for weekly 17P administration in this pregnancy.    Patient has no contraindications to 17P.    Informed patient that 17P requires a commitment to weekly injection which should begin before 20+6 weeks and abrupt discontinuation can increase the risk for  birth.  Patient agrees to proceed with weekly 17P injections.      Plan:   Patient is not certain she wants to do the Lis injections and since she was only 1 day shy of 37 wks, I am not certain that the benefit of giving it is warranted.  I will have her discuss this with [facility] during her consultation with them.  17P 250mg IM weekly beginning between 16-20 weeks through 36 weeks gestation  Serial transvaginal ultrasound for cervical length from 16 through 22-23 weeks gestation  Screen for asymptomatic bacteriuria at first prenatal visit and treat with appropriate antibiotics if present  Smoking risk discussed. Non smoking household.          "

## 2019-10-31 ENCOUNTER — TRANSCRIBE ORDERS (OUTPATIENT)
Dept: MATERNAL FETAL MEDICINE | Facility: CLINIC | Age: 34
End: 2019-10-31

## 2019-10-31 DIAGNOSIS — O26.90 PREGNANCY RELATED CONDITION, ANTEPARTUM: Primary | ICD-10-CM

## 2019-11-25 ENCOUNTER — PRENATAL OFFICE VISIT (OUTPATIENT)
Dept: FAMILY MEDICINE | Facility: CLINIC | Age: 34
End: 2019-11-25
Payer: COMMERCIAL

## 2019-11-25 VITALS
BODY MASS INDEX: 30.35 KG/M2 | DIASTOLIC BLOOD PRESSURE: 70 MMHG | SYSTOLIC BLOOD PRESSURE: 108 MMHG | TEMPERATURE: 97.3 F | OXYGEN SATURATION: 100 % | WEIGHT: 199.6 LBS | RESPIRATION RATE: 18 BRPM | HEART RATE: 87 BPM

## 2019-11-25 DIAGNOSIS — O09.92 HIGH-RISK PREGNANCY IN SECOND TRIMESTER: Primary | ICD-10-CM

## 2019-11-25 DIAGNOSIS — Z98.891 S/P CESAREAN SECTION: ICD-10-CM

## 2019-11-25 DIAGNOSIS — O09.522 MULTIGRAVIDA OF ADVANCED MATERNAL AGE IN SECOND TRIMESTER: ICD-10-CM

## 2019-11-25 PROCEDURE — 99207 ZZC COMPLICATED OB VISIT: CPT | Performed by: FAMILY MEDICINE

## 2019-11-25 ASSESSMENT — PAIN SCALES - GENERAL: PAINLEVEL: NO PAIN (0)

## 2019-11-25 NOTE — NURSING NOTE
Chief Complaint   Patient presents with     Prenatal Care     13w0d  Mary Alice Ventura MA on 11/25/2019 at 5:24 PM

## 2019-11-25 NOTE — PROGRESS NOTES
Taking PNVs, having some looser stools.  Will increase her fiber with fibercon tablets 2 in the morning with 8 oz of fluid. Discussed AFP quad screen and when it would be drawn (16 wks).  Will do this in 3-4 wks.  Has an appt with MARIANA on January 3rd.      Electronically signed by:  Mello Nguyễn M.D.  11/25/2019

## 2019-12-10 ENCOUNTER — PRENATAL OFFICE VISIT (OUTPATIENT)
Dept: FAMILY MEDICINE | Facility: CLINIC | Age: 34
End: 2019-12-10
Payer: COMMERCIAL

## 2019-12-10 VITALS
RESPIRATION RATE: 18 BRPM | BODY MASS INDEX: 31.02 KG/M2 | OXYGEN SATURATION: 99 % | WEIGHT: 204 LBS | TEMPERATURE: 96.6 F | HEART RATE: 92 BPM | SYSTOLIC BLOOD PRESSURE: 110 MMHG | DIASTOLIC BLOOD PRESSURE: 62 MMHG

## 2019-12-10 DIAGNOSIS — O09.92 HIGH-RISK PREGNANCY IN SECOND TRIMESTER: ICD-10-CM

## 2019-12-10 DIAGNOSIS — O09.522 MULTIGRAVIDA OF ADVANCED MATERNAL AGE IN SECOND TRIMESTER: Primary | ICD-10-CM

## 2019-12-10 DIAGNOSIS — O34.211 MATERNAL CARE DUE TO LOW TRANSVERSE UTERINE SCAR FROM PREVIOUS CESAREAN DELIVERY: ICD-10-CM

## 2019-12-10 PROCEDURE — 99207 ZZC COMPLICATED OB VISIT: CPT | Performed by: FAMILY MEDICINE

## 2019-12-10 ASSESSMENT — PAIN SCALES - GENERAL: PAINLEVEL: NO PAIN (0)

## 2019-12-10 NOTE — PROGRESS NOTES
Taking PNV, no problems. No loss of fluids, no change in discharge, no blood. Is starting to feel occasional fetal movement. Is still having some loose stools and some constipation. Hasn't tried fibercon yet. Started drinking coffee again which helps. Eating more spinach and strawberries. She would like to address these issues with just diet and water, if she can. She is also having occasional hip pain that starts in her back and radiates down into her pelvis. She notices it more when she is trying to get out of bed and may aggravate it. It comes and goes and she does not experience it every day.     Had questions about being able to eat feta cheese and venison jerky. I have no immediate concerns about these foods, with the most obvious effecst being from high salt content. I am less concerned about infections like listeria or problems with unpasteurized milk, as this does not sound like the risks in the food that she is interested in eating. We discussed eating in moderation.     She is planning to do a quad screen at 16 weeks. She will make an appointment with lab on her way out today. Has an appointment with MF on 1/3/2020.     This document serves as a record of the services and decisions personally performed and made by Mello Ndiaye MD.  It was created on his behalf by Arianne Berger, a trained medical student and scribe.  The creation of this record is based on the provider's personal observations and the statements of the patient.     Arianne Berger, MPH, MS3  December 10, 2019    I agree with the PFSH and ROS as completed by the MS.  The remainder of the encounter was performed by me and scribed by the MS.  The scribed note accurately reflects my personal services and the decisions made by me.     Electronically signed by:  Mello Nguyễn M.D.  12/11/2019

## 2019-12-17 DIAGNOSIS — O09.92 HIGH-RISK PREGNANCY IN SECOND TRIMESTER: ICD-10-CM

## 2019-12-17 DIAGNOSIS — O09.522 MULTIGRAVIDA OF ADVANCED MATERNAL AGE IN SECOND TRIMESTER: ICD-10-CM

## 2019-12-17 PROCEDURE — 99000 SPECIMEN HANDLING OFFICE-LAB: CPT | Performed by: FAMILY MEDICINE

## 2019-12-17 PROCEDURE — 36415 COLL VENOUS BLD VENIPUNCTURE: CPT | Performed by: FAMILY MEDICINE

## 2019-12-17 PROCEDURE — 81511 FTL CGEN ABNOR FOUR ANAL: CPT | Mod: 90 | Performed by: FAMILY MEDICINE

## 2019-12-20 LAB
# FETUSES US: NORMAL
# FETUSES: 1
AFP ADJ MOM AMN: 0.75
AFP SERPL-MCNC: 20 NG/ML
AGE - REPORTED: 36 YR
CURRENT SMOKER: NO
CURRENT SMOKER: NO
DIABETES STATUS PATIENT: NO
FAMILY MEMBER DISEASES HX: NO
FAMILY MEMBER DISEASES HX: NO
GA METHOD: NORMAL
GA METHOD: NORMAL
GA: NORMAL WK
HCG MOM SERPL: 0.67
HCG SERPL-ACNC: NORMAL IU/L
HX OF HEREDITARY DISORDERS: NO
IDDM PATIENT QL: NO
INHIBIN A MOM SERPL: 0.83
INHIBIN A SERPL-MCNC: 124 PG/ML
INTEGRATED SCN PATIENT-IMP: NORMAL
IVF PREGNANCY: NO
LMP START DATE: NORMAL
MONOCHORIONIC TWINS: NO
PATHOLOGY STUDY: NORMAL
PREV FETUS DEFECT: NO
SERVICE CMNT-IMP: NO
SPECIMEN DRAWN SERPL: NORMAL
U ESTRIOL MOM SERPL: 0.78
U ESTRIOL SERPL-MCNC: 0.71 NG/ML
VALPROIC/CARBAMAZEPINE STATUS: NO
WEIGHT UNITS: NORMAL

## 2019-12-31 ENCOUNTER — E-VISIT (OUTPATIENT)
Dept: FAMILY MEDICINE | Facility: CLINIC | Age: 34
End: 2019-12-31
Payer: COMMERCIAL

## 2019-12-31 ENCOUNTER — NURSE TRIAGE (OUTPATIENT)
Dept: NURSING | Facility: CLINIC | Age: 34
End: 2019-12-31

## 2019-12-31 ENCOUNTER — MYC MEDICAL ADVICE (OUTPATIENT)
Dept: FAMILY MEDICINE | Facility: CLINIC | Age: 34
End: 2019-12-31

## 2019-12-31 DIAGNOSIS — Z53.9 ERRONEOUS ENCOUNTER--DISREGARD: Primary | ICD-10-CM

## 2019-12-31 NOTE — TELEPHONE ENCOUNTER
18 weeks pregnant and last week noticed discharge that was white and no odor.  Yesterday at work had an odor and itching.  Denies fever.      Reason for Disposition    Bad or foul-smelling urine    Additional Information    Negative: Shock suspected (e.g., cold/pale/clammy skin, too weak to stand, low BP, rapid pulse)    Negative: Sounds like a life-threatening emergency to the triager    Negative: [1] Unable to urinate (or only a few drops) > 4 hours AND     [2] bladder feels very full (e.g., palpable bladder or strong urge to urinate)    Negative: [1] Decreased urination and [2] drinking very little AND [2] dehydration suspected (e.g., dark urine, no urine > 12 hours, very dry mouth, very lightheaded)    Negative: Patient sounds very sick or weak to the triager    Negative: Fever > 100.5 F (38.1 C)    Negative: Side (flank) or lower back pain present    Negative: [1] Can't control passage of urine (i.e., urinary incontinence) AND [2] new onset (< 2 weeks) or worsening    Negative: Urinating more frequently than usual (i.e., frequency)    Protocols used: URINARY SYMPTOMS-A-AH

## 2019-12-31 NOTE — TELEPHONE ENCOUNTER
Pt calling to see if she can be worked in to be seen today by someone in Dr Nguyễn's absence. Please advise.

## 2019-12-31 NOTE — TELEPHONE ENCOUNTER
Patient was not able to use this visit to be seen patient is out of town and will be stopping to see an urgent care or express care where she is at.   Rita RAMIREZ

## 2019-12-31 NOTE — TELEPHONE ENCOUNTER
I have mycharted patient asking to do a evisit. And seeing if she can do one if not let us know. I have now just called and left a message on  to call us back.     Is she able to do a evisit?     (If not let us know and ill talk to one of the providers if we can just have her come in for swab.)    Viktoriya Taylor MA 12/31/2019

## 2020-01-02 ENCOUNTER — PRE VISIT (OUTPATIENT)
Dept: MATERNAL FETAL MEDICINE | Facility: CLINIC | Age: 35
End: 2020-01-02

## 2020-01-03 ENCOUNTER — HOSPITAL ENCOUNTER (OUTPATIENT)
Dept: ULTRASOUND IMAGING | Facility: CLINIC | Age: 35
Discharge: HOME | End: 2020-01-03
Attending: FAMILY MEDICINE | Admitting: OBSTETRICS & GYNECOLOGY
Payer: COMMERCIAL

## 2020-01-03 ENCOUNTER — OFFICE VISIT (OUTPATIENT)
Dept: MATERNAL FETAL MEDICINE | Facility: CLINIC | Age: 35
End: 2020-01-03
Attending: FAMILY MEDICINE
Payer: COMMERCIAL

## 2020-01-03 DIAGNOSIS — O09.522 SUPERVISION OF ELDERLY MULTIGRAVIDA IN SECOND TRIMESTER: Primary | ICD-10-CM

## 2020-01-03 DIAGNOSIS — O26.90 PREGNANCY RELATED CONDITION, ANTEPARTUM: ICD-10-CM

## 2020-01-03 DIAGNOSIS — O09.522 MULTIGRAVIDA OF ADVANCED MATERNAL AGE IN SECOND TRIMESTER: Primary | ICD-10-CM

## 2020-01-03 PROCEDURE — 96040 ZZH GENETIC COUNSELING, EACH 30 MINUTES: CPT | Mod: ZF | Performed by: GENETIC COUNSELOR, MS

## 2020-01-03 PROCEDURE — 76811 OB US DETAILED SNGL FETUS: CPT

## 2020-01-03 NOTE — PROGRESS NOTES
[facility]  Genetic Counseling Consult    Patient:  Amy Moore YOB: 1985   Date of Service:  20      Amy Mooer was seen at the [facility] for genetic consultation as part of her appointment for comprehensive ultrasound.  The indication for genetic counseling is advanced maternal age. She was accompanied to today's visit by her partner.       Impression/Plan:   1. Amy had a quad screen earlier in pregnancy, which was screen negative.    2. Amy had a comprehensive (level II) ultrasound today.  Please see the ultrasound report for further details.    3. The patient declines genetic amniocentesis and additional maternal serum screening today.    Pregnancy History:   /Parity:    Age at Delivery: 35 year old  DEENA: 2020, by Est. Date of Conception  Gestational Age: 18w4d    No significant complications or exposures were reported in the current pregnancy.    Amy galloway pregnancy history is significant for:  o 36+6, SROM, , male    Medical History:   Amy galloway reported medical history is not expected to impact pregnancy management or risks to fetal development.       Family History:   A three-generation pedigree was obtained.   The following significant findings were reported by Amy:    Amy's partner has a nephew with spina bifida.     Amy's partner has a paternal first cousin with trisomy 21.    Otherwise, the reported family history is negative for multiple miscarriages, stillbirths, birth defects, mental retardation, known genetic conditions, and consanguinity.       Carrier Screening:   The patient reports that she and the father of the pregnancy have  ancestry:      Cystic fibrosis is an autosomal recessive genetic condition that occurs with increased frequency in individuals of  ancestry and carrier screening for this condition is available.  In addition,   screening in the [facility] includes cystic fibrosis.      Expanded carrier screening for mutations in a large panel of genes associated with autosomal recessive conditions including cystic fibrosis, spinal muscular atrophy, and others, is now available.      If Amy and/or her partner choose to pursue carrier screening, [facility] clinic is available to assist in coordination if desired.         Risk Assessment for Chromosome Conditions:   We explained that the risk for fetal chromosome abnormalities increases with maternal age. We discussed specific features of common chromosome abnormalities, including Down syndrome, trisomy 13, trisomy 18, and sex chromosome trisomies.      - At age 35 at midtrimester, the risk to have a baby with Down syndrome is 1 in 216.     - At age 35 at midtrimester, the risk to have a baby with any chromosome abnormality is 1 in 105.       Amy had maternal serum screening earlier in pregnancy.    Quad screen    Maternal plasma AFP, hCG, estriol, and inhibin measurement between 15-24 weeks gestation    Provides risk assessment for fetal Down syndrome, trisomy 18, and neural tube defects    Amy had a quad screen earlier in pregnancy; we reviewed the results today, which were Screen Negative    Chemical values were as follows    AFP 0.75 MoM, hCG 0.67 MoM, estriol 0.78 MoM, and RIGO 0.83 MoM    This screen gave the following risk assessments:    Down syndrome risk= 1:1,970    Trisomy 18 risk= 1:3,150    Open neural tube defects risk= less than 1:10,000       Testing Options:   We discussed the following options:   Non-invasive Prenatal Testing (NIPT)    Maternal plasma cell-free DNA testing; first trimester ultrasound with nuchal translucency and nasal bone assessment is recommended, when appropriate    Screens for fetal trisomy 21, trisomy 13, trisomy 18, and sex chromosome aneuploidy    Cannot screen for open neural tube defects; maternal serum AFP after 15 weeks is  recommended     Comprehensive (Level II) ultrasound: Detailed ultrasound performed between 18-22 weeks gestation to screen for major birth defects and markers for aneuploidy.      We reviewed the benefits and limitations of this testing.  Screening tests provide a risk assessment specific to the pregnancy for certain fetal chromosome abnormalities, but cannot definitively diagnose or exclude a fetal chromosome abnormality.  Follow-up genetic counseling and consideration of diagnostic testing is recommended with any abnormal screening result.     Diagnostic tests carry inherent risks- including risk of miscarriage- that require careful consideration.  These tests can detect fetal chromosome abnormalities with greater than 99% certainty.  Results can be compromised by maternal cell contamination or mosaicism, and are limited by the resolution of cytogenetic G-banding technology.  There is no screening nor diagnostic test that can detect all forms of birth defects or mental disability.    It was a pleasure to be involved with Amy galloway care. Face-to-face time of the meeting was 30 minutes.      Rosi Unger MS, [facility]  Maternal Fetal Medicine  [facility]  Ph: [phone]  [email or web address]

## 2020-01-03 NOTE — PROGRESS NOTES
"Please see \"Imaging\" tab under Chart Review for full details.    Melva Patiño MD  Maternal Fetal Medicine    "

## 2020-01-13 ENCOUNTER — MYC MEDICAL ADVICE (OUTPATIENT)
Dept: FAMILY MEDICINE | Facility: CLINIC | Age: 35
End: 2020-01-13

## 2020-01-13 DIAGNOSIS — N89.8 VAGINAL DISCHARGE DURING PREGNANCY IN SECOND TRIMESTER: Primary | ICD-10-CM

## 2020-01-13 DIAGNOSIS — O26.892 VAGINAL DISCHARGE DURING PREGNANCY IN SECOND TRIMESTER: Primary | ICD-10-CM

## 2020-01-14 ENCOUNTER — MYC MEDICAL ADVICE (OUTPATIENT)
Dept: FAMILY MEDICINE | Facility: CLINIC | Age: 35
End: 2020-01-14

## 2020-01-14 DIAGNOSIS — N76.0 BV (BACTERIAL VAGINOSIS): Primary | ICD-10-CM

## 2020-01-14 DIAGNOSIS — O26.892 VAGINAL DISCHARGE DURING PREGNANCY IN SECOND TRIMESTER: ICD-10-CM

## 2020-01-14 DIAGNOSIS — B96.89 BV (BACTERIAL VAGINOSIS): Primary | ICD-10-CM

## 2020-01-14 DIAGNOSIS — N89.8 VAGINAL DISCHARGE DURING PREGNANCY IN SECOND TRIMESTER: ICD-10-CM

## 2020-01-14 LAB
SPECIMEN SOURCE: ABNORMAL
WET PREP SPEC: ABNORMAL

## 2020-01-14 PROCEDURE — 87210 SMEAR WET MOUNT SALINE/INK: CPT | Performed by: FAMILY MEDICINE

## 2020-01-14 RX ORDER — METRONIDAZOLE 500 MG/1
500 TABLET ORAL 2 TIMES DAILY
Qty: 14 TABLET | Refills: 0 | Status: ON HOLD | OUTPATIENT
Start: 2020-01-14 | End: 2020-02-09

## 2020-01-14 NOTE — TELEPHONE ENCOUNTER
I have the patient scheduled for her ultrasound on Thursday 01/16/2020 at 345pm.  I have left the patient a message with this information.     Rita RAMIREZ

## 2020-01-14 NOTE — TELEPHONE ENCOUNTER
I sent the patient a MyChart message stating that she can come in to do a self swab for bacterial vaginosis and that we will set up a follow-up ultrasound to do a cervical length.  Please get that scheduled for her.  Both orders have been placed.    Electronically signed by:  Mello Nguyễn M.D.  1/14/2020

## 2020-01-17 ENCOUNTER — HOSPITAL ENCOUNTER (OUTPATIENT)
Dept: ULTRASOUND IMAGING | Facility: CLINIC | Age: 35
Discharge: HOME | End: 2020-01-17
Attending: FAMILY MEDICINE | Admitting: FAMILY MEDICINE
Payer: COMMERCIAL

## 2020-01-17 DIAGNOSIS — N89.8 VAGINAL DISCHARGE DURING PREGNANCY IN SECOND TRIMESTER: ICD-10-CM

## 2020-01-17 DIAGNOSIS — O26.892 VAGINAL DISCHARGE DURING PREGNANCY IN SECOND TRIMESTER: ICD-10-CM

## 2020-01-17 PROCEDURE — 76816 OB US FOLLOW-UP PER FETUS: CPT

## 2020-01-20 ENCOUNTER — PRENATAL OFFICE VISIT (OUTPATIENT)
Dept: FAMILY MEDICINE | Facility: CLINIC | Age: 35
End: 2020-01-20
Payer: COMMERCIAL

## 2020-01-20 VITALS
WEIGHT: 215 LBS | SYSTOLIC BLOOD PRESSURE: 110 MMHG | OXYGEN SATURATION: 99 % | HEART RATE: 78 BPM | HEIGHT: 67 IN | BODY MASS INDEX: 33.74 KG/M2 | TEMPERATURE: 97.9 F | DIASTOLIC BLOOD PRESSURE: 66 MMHG | RESPIRATION RATE: 16 BRPM

## 2020-01-20 DIAGNOSIS — O34.211 MATERNAL CARE DUE TO LOW TRANSVERSE UTERINE SCAR FROM PREVIOUS CESAREAN DELIVERY: ICD-10-CM

## 2020-01-20 DIAGNOSIS — G47.00 INSOMNIA, UNSPECIFIED TYPE: ICD-10-CM

## 2020-01-20 DIAGNOSIS — O09.92 HIGH-RISK PREGNANCY IN SECOND TRIMESTER: Primary | ICD-10-CM

## 2020-01-20 DIAGNOSIS — G56.01 CARPAL TUNNEL SYNDROME ON RIGHT: ICD-10-CM

## 2020-01-20 PROCEDURE — 99207 ZZC COMPLICATED OB VISIT: CPT | Performed by: FAMILY MEDICINE

## 2020-01-20 RX ORDER — HYDROXYZINE PAMOATE 25 MG/1
25-100 CAPSULE ORAL
Qty: 90 CAPSULE | Refills: 1 | Status: SHIPPED | OUTPATIENT
Start: 2020-01-20 | End: 2020-05-11

## 2020-01-20 ASSESSMENT — MIFFLIN-ST. JEOR: SCORE: 1699.68

## 2020-01-20 ASSESSMENT — PAIN SCALES - GENERAL: PAINLEVEL: MODERATE PAIN (5)

## 2020-01-21 NOTE — PROGRESS NOTES
She feels like she is still having some cramping and contractions.  Sometimes she will have lab in 1 hour and then nothing for 4 to 6 hours and then more in the evening.  If she is doing more activity she will feel more cramping and contractions.  Typically they are short-lived but sometimes it will last up to a minute.  She had an ultrasound just done a couple days ago which showed no shortening of the cervix with her cervix being 5.9 cm long with no evidence of any funneling.  She had a wet prep that was positive for clue cells so I did treat her for bacterial vaginosis.  She is just finishing up that treatment.  Tomorrow will be her last day of antibiotics.  She is due to have her Glucola test done next month so we did place orders for that.  She is aware that she should not eat anything for 2 hours prior to coming in especially if it is high in simple sugars.  We tentatively talked about setting up a repeat  at 39 weeks which would be May 26, 2020.  She is having a hard time sleeping so we will try hydroxyzine 25 mg capsules 1 to 4 capsules at bedtime to help her rest better.  I told her to take 1 capsule to start with and take it nightly for at least a week to 10 days to get her sleep hygiene improved.  She can then use them as needed.  Is also having bad carpal tunnel in her right.  She is got this towards the end of her first pregnancy but it is happening pretty significantly already.  We will use a wrist splint at bedtime and at work if needed.    Electronically signed by:  Mello Nguyễn M.D.  2020

## 2020-02-09 ENCOUNTER — HOSPITAL ENCOUNTER (OUTPATIENT)
Facility: CLINIC | Age: 35
Discharge: HOME | End: 2020-02-09
Attending: FAMILY MEDICINE | Admitting: FAMILY MEDICINE
Payer: COMMERCIAL

## 2020-02-09 VITALS
DIASTOLIC BLOOD PRESSURE: 73 MMHG | HEART RATE: 82 BPM | SYSTOLIC BLOOD PRESSURE: 128 MMHG | RESPIRATION RATE: 16 BRPM | TEMPERATURE: 99 F

## 2020-02-09 PROBLEM — Z36.89 ENCOUNTER FOR TRIAGE IN PREGNANT PATIENT: Status: ACTIVE | Noted: 2020-02-09

## 2020-02-09 PROCEDURE — G0463 HOSPITAL OUTPT CLINIC VISIT: HCPCS

## 2020-02-09 NOTE — PROGRESS NOTES
S: Triage Arrival  B: Amy is a 34 y.o.  @ 23w 6d who presents with complaint of decreased fetal movement since slipping down two steps last evening. She landed on her bottom. Pt reports that baby has been moving this AM, but not as much as usual. Pt denies bldg.  A: EFM applied. FHT's130 with moderate variability, accels present, no decels noted on strip. Once baby was on monitor, there was lots of fetal movement. Mom reported feeling much of it.  R: Will notify MD to obtain further orders.

## 2020-02-09 NOTE — PROGRESS NOTES
S:  Discharge from triage.   A:  FHT's 130, Moderate variability, Accels present, no decels noted. Contractions none.   R:  Written and verbal D/C instruction provided. Pt. Verbalized understanding of D/C instructions and will follow up with AF as previously scheduled.   Verbalizes understanding that she will call or return to the [facility] with any further questions or concerns.   Pt. D/C'd via walking with spouse.    Nursing Discharge Checklist  Discharge order entered: Yes  Patient care order entered: Yes  Charges entered: Yes

## 2020-02-16 ENCOUNTER — NURSE TRIAGE (OUTPATIENT)
Dept: NURSING | Facility: CLINIC | Age: 35
End: 2020-02-16

## 2020-02-17 ENCOUNTER — PRENATAL OFFICE VISIT (OUTPATIENT)
Dept: FAMILY MEDICINE | Facility: CLINIC | Age: 35
End: 2020-02-17
Payer: COMMERCIAL

## 2020-02-17 VITALS
TEMPERATURE: 97.8 F | BODY MASS INDEX: 34.95 KG/M2 | OXYGEN SATURATION: 98 % | WEIGHT: 221.8 LBS | RESPIRATION RATE: 16 BRPM | DIASTOLIC BLOOD PRESSURE: 60 MMHG | HEART RATE: 108 BPM | SYSTOLIC BLOOD PRESSURE: 122 MMHG

## 2020-02-17 DIAGNOSIS — Z34.80 SUPERVISION OF OTHER NORMAL PREGNANCY, ANTEPARTUM: Primary | ICD-10-CM

## 2020-02-17 DIAGNOSIS — N76.0 BV (BACTERIAL VAGINOSIS): ICD-10-CM

## 2020-02-17 DIAGNOSIS — N89.8 VAGINAL DISCHARGE DURING PREGNANCY IN SECOND TRIMESTER: ICD-10-CM

## 2020-02-17 DIAGNOSIS — O26.892 VAGINAL DISCHARGE DURING PREGNANCY IN SECOND TRIMESTER: ICD-10-CM

## 2020-02-17 DIAGNOSIS — B96.89 BV (BACTERIAL VAGINOSIS): ICD-10-CM

## 2020-02-17 DIAGNOSIS — O09.92 HIGH-RISK PREGNANCY IN SECOND TRIMESTER: ICD-10-CM

## 2020-02-17 LAB
ALBUMIN UR-MCNC: NEGATIVE MG/DL
APPEARANCE UR: CLEAR
BILIRUB UR QL STRIP: NEGATIVE
COLOR UR AUTO: YELLOW
GLUCOSE 1H P 50 G GLC PO SERPL-MCNC: 123 MG/DL (ref 60–129)
GLUCOSE UR STRIP-MCNC: NEGATIVE MG/DL
HGB BLD-MCNC: 12.8 G/DL (ref 11.7–15.7)
HGB UR QL STRIP: NEGATIVE
KETONES UR STRIP-MCNC: 20 MG/DL
LEUKOCYTE ESTERASE UR QL STRIP: NEGATIVE
NITRATE UR QL: NEGATIVE
PH UR STRIP: 6 PH (ref 5–7)
SOURCE: ABNORMAL
SP GR UR STRIP: 1.02 (ref 1–1.03)
SPECIMEN SOURCE: ABNORMAL
UROBILINOGEN UR STRIP-MCNC: 0 MG/DL (ref 0–2)
WET PREP SPEC: ABNORMAL

## 2020-02-17 PROCEDURE — 82950 GLUCOSE TEST: CPT | Performed by: FAMILY MEDICINE

## 2020-02-17 PROCEDURE — 86780 TREPONEMA PALLIDUM: CPT | Performed by: FAMILY MEDICINE

## 2020-02-17 PROCEDURE — 81003 URINALYSIS AUTO W/O SCOPE: CPT | Performed by: FAMILY MEDICINE

## 2020-02-17 PROCEDURE — 00000218 ZZHCL STATISTIC OBHBG - HEMOGLOBIN: Performed by: FAMILY MEDICINE

## 2020-02-17 PROCEDURE — 87210 SMEAR WET MOUNT SALINE/INK: CPT | Performed by: FAMILY MEDICINE

## 2020-02-17 PROCEDURE — 36415 COLL VENOUS BLD VENIPUNCTURE: CPT | Performed by: FAMILY MEDICINE

## 2020-02-17 PROCEDURE — 99207 ZZC COMPLICATED OB VISIT: CPT | Performed by: FAMILY MEDICINE

## 2020-02-17 RX ORDER — METRONIDAZOLE 500 MG/1
500 TABLET ORAL 2 TIMES DAILY
Qty: 14 TABLET | Refills: 0 | Status: SHIPPED | OUTPATIENT
Start: 2020-02-17 | End: 2020-03-23

## 2020-02-17 ASSESSMENT — PAIN SCALES - GENERAL: PAINLEVEL: NO PAIN (0)

## 2020-02-17 NOTE — PROGRESS NOTES
Concerns:     Hands turned blue on Saturday and Sunday again.    Patient is having a lot of sinus drainage. She is having pressure in bilateral ears but more in the left.      Abdominal pain around the belly button. Like a pulling pain that is located left of the belly button. When it is touched it is sensitive or tender to the touch.    No vaginal bleeding, no contractions, no leakage of fluid  No nausea/vomiting. No heartburn  No vaginal discharge. No dysuria.   No headache, vision changes, lower extremity swelling, upper abdominal pain, chest pain, shortness of breath  Tdap planned next visit  Discussed PTL, PROM, and when to call or come in.  Normal anatomy ultrasound.  RTC 4 weeks.  GTT and labs today     Her carpal tunnel is getting worse so she is going to start to wear her right wrist brace all the time at night when she is not working.  I reassured her that generally this does not get much better before she delivers and should improve and completely resolve once she is 4 to 6 weeks out from delivery.  She has no obvious defect around the periumbilical region for umbilical hernia but some back pain could just be stretching of the aponeurosis between the rectus muscles.  Her postnasal drip is what this most likely causing that thick mucus in the back of the throat and this should resolve as with most viral illnesses.  She will see me in 4 weeks.  She will need her Tdap    Electronically signed by:  Mello Nguyễn M.D.  2/17/2020

## 2020-02-17 NOTE — NURSING NOTE
Health Maintenance Due   Topic Date Due     PHQ-2  01/01/2020     PREVENTIVE CARE VISIT  01/17/2020     OBGCT (OB)  02/10/2020     Patient completed phq-2 in clinic.    Yari Saunders, CMA

## 2020-02-18 LAB — T PALLIDUM AB SER QL: NONREACTIVE

## 2020-02-25 ENCOUNTER — MYC MEDICAL ADVICE (OUTPATIENT)
Dept: FAMILY MEDICINE | Facility: CLINIC | Age: 35
End: 2020-02-25

## 2020-03-02 ENCOUNTER — HEALTH MAINTENANCE LETTER (OUTPATIENT)
Age: 35
End: 2020-03-02

## 2020-03-16 ENCOUNTER — MYC MEDICAL ADVICE (OUTPATIENT)
Dept: FAMILY MEDICINE | Facility: CLINIC | Age: 35
End: 2020-03-16

## 2020-03-23 ENCOUNTER — PRENATAL OFFICE VISIT (OUTPATIENT)
Dept: FAMILY MEDICINE | Facility: CLINIC | Age: 35
End: 2020-03-23
Payer: COMMERCIAL

## 2020-03-23 VITALS
TEMPERATURE: 98.6 F | RESPIRATION RATE: 18 BRPM | SYSTOLIC BLOOD PRESSURE: 122 MMHG | WEIGHT: 225 LBS | HEART RATE: 80 BPM | OXYGEN SATURATION: 96 % | DIASTOLIC BLOOD PRESSURE: 76 MMHG | BODY MASS INDEX: 35.45 KG/M2

## 2020-03-23 DIAGNOSIS — O34.211 MATERNAL CARE DUE TO LOW TRANSVERSE UTERINE SCAR FROM PREVIOUS CESAREAN DELIVERY: ICD-10-CM

## 2020-03-23 DIAGNOSIS — Z23 NEED FOR VACCINATION: ICD-10-CM

## 2020-03-23 DIAGNOSIS — O09.93 HIGH-RISK PREGNANCY IN THIRD TRIMESTER: ICD-10-CM

## 2020-03-23 DIAGNOSIS — O09.523 MULTIGRAVIDA OF ADVANCED MATERNAL AGE IN THIRD TRIMESTER: ICD-10-CM

## 2020-03-23 PROCEDURE — 59425 ANTEPARTUM CARE ONLY: CPT | Performed by: FAMILY MEDICINE

## 2020-03-23 PROCEDURE — 90471 IMMUNIZATION ADMIN: CPT | Performed by: FAMILY MEDICINE

## 2020-03-23 PROCEDURE — 90715 TDAP VACCINE 7 YRS/> IM: CPT | Performed by: FAMILY MEDICINE

## 2020-03-23 ASSESSMENT — PAIN SCALES - GENERAL: PAINLEVEL: NO PAIN (0)

## 2020-03-24 NOTE — PROGRESS NOTES
Taking PNVs, no problems.  No change in discharge, no bleeding or leaking of fluid.  She has not had any contractions and has good fetal movement.  She is due for Tdap today and it was given.  She is concerned that her  is getting extremely anxious over the coronavirus and it seems to really be causing him a lot of anxiety and stress.  We talked extensively about his behaviors and how restrictive he is with what she and her son can do outside of the home.  It is causing a lot of stress in the household.  I told her to have a calm conversation with him tonight and then I wanted to talk to him in the office so that we can discuss whether he needs to be on something to help with his anxiety.  She will see me in 2 weeks.    Electronically signed by:  Mello Nguyễn M.D.  3/23/2020

## 2020-04-06 ENCOUNTER — E-VISIT (OUTPATIENT)
Dept: FAMILY MEDICINE | Facility: CLINIC | Age: 35
End: 2020-04-06
Payer: COMMERCIAL

## 2020-04-06 DIAGNOSIS — B96.89 BV (BACTERIAL VAGINOSIS): ICD-10-CM

## 2020-04-06 DIAGNOSIS — N76.0 BV (BACTERIAL VAGINOSIS): ICD-10-CM

## 2020-04-06 PROCEDURE — 99213 OFFICE O/P EST LOW 20 MIN: CPT | Mod: TEL | Performed by: FAMILY MEDICINE

## 2020-04-06 RX ORDER — METRONIDAZOLE 500 MG/1
500 TABLET ORAL 2 TIMES DAILY
Qty: 14 TABLET | Refills: 1 | Status: SHIPPED | OUTPATIENT
Start: 2020-04-06 | End: 2020-04-23

## 2020-04-13 ENCOUNTER — PRENATAL OFFICE VISIT (OUTPATIENT)
Dept: FAMILY MEDICINE | Facility: CLINIC | Age: 35
End: 2020-04-13

## 2020-04-13 DIAGNOSIS — O26.893 HEARTBURN DURING PREGNANCY IN THIRD TRIMESTER: ICD-10-CM

## 2020-04-13 DIAGNOSIS — Z98.891 S/P CESAREAN SECTION: ICD-10-CM

## 2020-04-13 DIAGNOSIS — O09.523 MULTIGRAVIDA OF ADVANCED MATERNAL AGE IN THIRD TRIMESTER: ICD-10-CM

## 2020-04-13 DIAGNOSIS — O09.93 HIGH-RISK PREGNANCY IN THIRD TRIMESTER: ICD-10-CM

## 2020-04-13 DIAGNOSIS — R12 HEARTBURN DURING PREGNANCY IN THIRD TRIMESTER: ICD-10-CM

## 2020-04-13 PROCEDURE — 99213 OFFICE O/P EST LOW 20 MIN: CPT | Performed by: FAMILY MEDICINE

## 2020-04-13 RX ORDER — OMEPRAZOLE 20 MG/1
20 TABLET, DELAYED RELEASE ORAL DAILY
Qty: 60 TABLET | Refills: 1 | Status: SHIPPED | OUTPATIENT
Start: 2020-04-13 | End: 2021-10-28

## 2020-04-13 ASSESSMENT — PAIN SCALES - GENERAL: PAINLEVEL: SEVERE PAIN (7)

## 2020-04-13 NOTE — NURSING NOTE
Chief Complaint   Patient presents with     Prenatal Care     alot of cramping and back pain this past week.     33w0d

## 2020-04-13 NOTE — PROGRESS NOTES
"Amy Moore is a 34 year old female who is being evaluated via a billable telephone visit.      The patient has been notified of following:     \"This telephone visit will be conducted via a call between you and your physician/provider. We have found that certain health care needs can be provided without the need for a physical exam.  This service lets us provide the care you need with a short phone conversation.  If a prescription is necessary we can send it directly to your pharmacy.  If lab work is needed we can place an order for that and you can then stop by our lab to have the test done at a later time.    Telephone visits are billed at different rates depending on your insurance coverage. During this emergency period, for some insurers they may be billed the same as an in-person visit.  Please reach out to your insurance provider with any questions.    If during the course of the call the physician/provider feels a telephone visit is not appropriate, you will not be charged for this service.\"    Patient has given verbal consent for Telephone visit?  Yes    How would you like to obtain your AVS? MyChart    Subjective     Amy Moore is a 34 year old female who presents to clinic today for the following health issues:    Chief Complaint   Patient presents with     Prenatal Care     alot of cramping and back pain this past week.   33w0d    No change in discharge or any bleeding noted.  It is happening more sporadically throughout the day but last night she had more of it that prevented her from sleeping.  Today now she is much better, so never has anything regular.  She has been having more heartburn daily with anything she is eating.  She needs to elevate her head due to the heartburn and burning sensation in her throat.       Patient Active Problem List   Diagnosis     Slow transit constipation     Weight gain     Pelvic pain in female     S/P  section     LORI (generalized anxiety disorder)     " Adjustment disorder with depressed mood     Internal hemorrhoids without complication     High-risk pregnancy     Maternal care due to low transverse uterine scar from previous  delivery     AMA (advanced maternal age) multigravida 35+     Carpal tunnel syndrome on right     Encounter for triage in pregnant patient     Multigravida of advanced maternal age in third trimester     High-risk pregnancy in third trimester     Past Surgical History:   Procedure Laterality Date      SECTION N/A 2016    Procedure:  SECTION;  Surgeon: Merrick Justice MD;  Location: [facility]       Social History     Tobacco Use     Smoking status: Former Smoker     Smokeless tobacco: Never Used     Tobacco comment: socially in early    Substance Use Topics     Alcohol use: No     Alcohol/week: 0.0 standard drinks     Comment: socially     Family History   Problem Relation Age of Onset     Hypothyroidism Maternal Grandmother      Thyroid Disease Maternal Grandmother      No Known Problems Maternal Grandfather      No Known Problems Paternal Grandmother      Myocardial Infarction Paternal Grandfather 40     Hypothyroidism Mother      Hypertension Mother      Thyroid Disease Mother      No Known Problems Father      Hypothyroidism Sister      Thyroid Disease Sister      Hypothyroidism Sister      Thyroid Disease Sister      No Known Problems Son          No Known Allergies  Recent Labs   Lab Test 19  1001 16  0943   LDL 95 93   HDL 47* 61   TRIG 55 64   TSH 2.35 2.58      BP Readings from Last 3 Encounters:   20 122/76   20 122/60   20 128/73    Wt Readings from Last 3 Encounters:   20 102.1 kg (225 lb)   20 100.6 kg (221 lb 12.8 oz)   20 97.5 kg (215 lb)                    Reviewed and updated as needed this visit by Provider  Tobacco  Allergies  Meds  Problems  Med Hx  Surg Hx  Fam Hx         Review of Systems   ROS COMP: Constitutional, HEENT,  cardiovascular, pulmonary, gi and gu systems are negative, except as otherwise noted.       Objective   Reported vitals:  LMP  (LMP Unknown)    healthy, alert and no distress  Remainder of exam unable to be completed due to telephone visits    Diagnostic Test Results:  Labs reviewed in Epic        Assessment/Plan:  (O09.523) Multigravida of advanced maternal age in third trimester  (O09.93) High-risk pregnancy in third trimester  (Z98.891) S/P  section  Comment: doing well overall except for some pelvic pressure and relaxation.  Plan: did do a Case request for her RLTCS on 2020    (O26.893,  R12) Heartburn during pregnancy in third trimester  Comment: having more and more heartburn and reflux symptoms that are preventing her from wanting to eat anything.    Plan: omeprazole (PRILOSEC OTC) 20 MG EC tablet        Will start omeprazole 40 mg for 2-3 days then go to one daily.       - omeprazole (PRILOSEC OTC) 20 MG EC tablet; Take 1 tablet (20 mg) by mouth daily  Dispense: 60 tablet; Refill: 1    She is going to pay attention more closely to the [place] contractions and the tightening she has been having.  She is aware that if she has more than 5 or 6 an hour for more than an hour that she needs to come in and be evaluated.  Also if she has any change that significant in vaginal discharge, bleeding, or fluid leaking she would need to come in.  She is agreeable to all this.    Return in about 10 days (around 2020), or prenatal visit.      Phone call duration:  22 minutes    Electronically signed by:  Mello Nguyễn M.D.  2020

## 2020-04-23 ENCOUNTER — PRENATAL OFFICE VISIT (OUTPATIENT)
Dept: FAMILY MEDICINE | Facility: CLINIC | Age: 35
End: 2020-04-23
Payer: COMMERCIAL

## 2020-04-23 VITALS
HEART RATE: 80 BPM | DIASTOLIC BLOOD PRESSURE: 72 MMHG | SYSTOLIC BLOOD PRESSURE: 130 MMHG | RESPIRATION RATE: 18 BRPM | WEIGHT: 233 LBS | BODY MASS INDEX: 36.71 KG/M2 | OXYGEN SATURATION: 98 % | TEMPERATURE: 98.5 F

## 2020-04-23 DIAGNOSIS — O09.93 HIGH-RISK PREGNANCY IN THIRD TRIMESTER: Primary | ICD-10-CM

## 2020-04-23 DIAGNOSIS — Z98.891 S/P CESAREAN SECTION: ICD-10-CM

## 2020-04-23 PROCEDURE — 99213 OFFICE O/P EST LOW 20 MIN: CPT | Performed by: FAMILY MEDICINE

## 2020-04-23 ASSESSMENT — PAIN SCALES - GENERAL: PAINLEVEL: NO PAIN (0)

## 2020-04-23 NOTE — PROGRESS NOTES
Taking PNVs, no problems.   No change in discharge, no bleeding or leaking of fluid.  She has not had any contractions and has good fetal movement.  Labs at her next visit.  Orders placed.  Baby is cephalic but very high and not engaged in the pelvis.  She is having occasional BH contractions but nothing regular.  I told her that it is ok for her to go on short walks and to be outside.  She will see one of my partners in 2 wks. Her RLTCS is scheduled for 5/26/2020    Electronically signed by:  Mello Nguyễn M.D.  4/23/2020

## 2020-05-06 NOTE — PROGRESS NOTES
Concerns: Discussed concerns with hemorrhoid.  States it is getting better on a daily basis she will continue tub soaks..  No vaginal bleeding, LOF, contractions.  No HA, RUQ pain, N/V, visual changes.  C-sections discussed with the patient.  Risks include but are not limited to bleeding, infection, emergent hysterectomy, injury to bowel and bladder and other maternal organs as well as risk of injury to the fetus.  I did tell the patient that Dr Nguyễn will be off the week of her scheduled  repeat.  She would like me to be there if possible him on that week I will try to schedule this with Dr. Messer and myself.  I did her last .  Discussed kick counts and fetal movement.  Reportable signs and symptoms discussed.  GBS done today.  Labor precautions discussed.  RTC 1 week.  Prenatal flowsheet information is reviewed.    Merrick Justice MD

## 2020-05-07 ENCOUNTER — PRENATAL OFFICE VISIT (OUTPATIENT)
Dept: FAMILY MEDICINE | Facility: CLINIC | Age: 35
End: 2020-05-07
Payer: COMMERCIAL

## 2020-05-07 VITALS
OXYGEN SATURATION: 99 % | BODY MASS INDEX: 37.13 KG/M2 | WEIGHT: 236.6 LBS | SYSTOLIC BLOOD PRESSURE: 110 MMHG | DIASTOLIC BLOOD PRESSURE: 72 MMHG | TEMPERATURE: 97.9 F | HEIGHT: 67 IN | RESPIRATION RATE: 16 BRPM | HEART RATE: 105 BPM

## 2020-05-07 DIAGNOSIS — O09.93 HIGH-RISK PREGNANCY IN THIRD TRIMESTER: ICD-10-CM

## 2020-05-07 DIAGNOSIS — Z98.891 S/P CESAREAN SECTION: ICD-10-CM

## 2020-05-07 LAB — HGB BLD-MCNC: 14.1 G/DL (ref 11.7–15.7)

## 2020-05-07 PROCEDURE — 86780 TREPONEMA PALLIDUM: CPT | Performed by: FAMILY MEDICINE

## 2020-05-07 PROCEDURE — 00000218 ZZHCL STATISTIC OBHBG - HEMOGLOBIN: Performed by: FAMILY MEDICINE

## 2020-05-07 PROCEDURE — 99207 ZZC PRENATAL VISIT: CPT | Performed by: FAMILY MEDICINE

## 2020-05-07 PROCEDURE — 36415 COLL VENOUS BLD VENIPUNCTURE: CPT | Performed by: FAMILY MEDICINE

## 2020-05-07 ASSESSMENT — MIFFLIN-ST. JEOR: SCORE: 1797.66

## 2020-05-08 DIAGNOSIS — Z11.59 ENCOUNTER FOR SCREENING FOR OTHER VIRAL DISEASES: Primary | ICD-10-CM

## 2020-05-08 LAB — T PALLIDUM AB SER QL: NONREACTIVE

## 2020-05-11 ENCOUNTER — PRENATAL OFFICE VISIT (OUTPATIENT)
Dept: FAMILY MEDICINE | Facility: CLINIC | Age: 35
End: 2020-05-11
Payer: COMMERCIAL

## 2020-05-11 VITALS
TEMPERATURE: 98 F | WEIGHT: 234 LBS | BODY MASS INDEX: 36.87 KG/M2 | DIASTOLIC BLOOD PRESSURE: 82 MMHG | HEART RATE: 114 BPM | SYSTOLIC BLOOD PRESSURE: 118 MMHG | OXYGEN SATURATION: 97 % | RESPIRATION RATE: 18 BRPM

## 2020-05-11 DIAGNOSIS — O09.93 HIGH-RISK PREGNANCY IN THIRD TRIMESTER: Primary | ICD-10-CM

## 2020-05-11 PROCEDURE — 99207 ZZC PRENATAL VISIT: CPT | Performed by: FAMILY MEDICINE

## 2020-05-11 PROCEDURE — 87653 STREP B DNA AMP PROBE: CPT | Performed by: FAMILY MEDICINE

## 2020-05-11 RX ORDER — CITRIC ACID/SODIUM CITRATE 334-500MG
30 SOLUTION, ORAL ORAL
Status: CANCELLED | OUTPATIENT
Start: 2020-05-11

## 2020-05-11 RX ORDER — SODIUM CHLORIDE, SODIUM LACTATE, POTASSIUM CHLORIDE, CALCIUM CHLORIDE 600; 310; 30; 20 MG/100ML; MG/100ML; MG/100ML; MG/100ML
INJECTION, SOLUTION INTRAVENOUS CONTINUOUS
Status: CANCELLED | OUTPATIENT
Start: 2020-05-11

## 2020-05-11 RX ORDER — CEFAZOLIN SODIUM 2 G/100ML
2 INJECTION, SOLUTION INTRAVENOUS
Status: CANCELLED | OUTPATIENT
Start: 2020-05-11

## 2020-05-11 RX ORDER — CEFAZOLIN SODIUM 1 G/3ML
1 INJECTION, POWDER, FOR SOLUTION INTRAMUSCULAR; INTRAVENOUS SEE ADMIN INSTRUCTIONS
Status: CANCELLED | OUTPATIENT
Start: 2020-05-11

## 2020-05-11 ASSESSMENT — PAIN SCALES - GENERAL: PAINLEVEL: NO PAIN (0)

## 2020-05-11 NOTE — PROGRESS NOTES
Concerns:     Doing well.  No concerns today.  No vaginal bleeding, LOF, contractions.  No HA, RUQ pain, N/V, visual changes.  US confirmed cephalic today, couldn't tell by leopolds ERAS orders and handouts given today  Discussed COVID precautions    Labor precautions discussed.  RTC 1 week.    Shabbir Huitron MD

## 2020-05-12 LAB
GP B STREP DNA SPEC QL NAA+PROBE: NEGATIVE
SPECIMEN SOURCE: NORMAL

## 2020-05-18 ENCOUNTER — ANESTHESIA (OUTPATIENT)
Dept: OBGYN | Facility: CLINIC | Age: 35
End: 2020-05-18
Payer: COMMERCIAL

## 2020-05-18 ENCOUNTER — HOSPITAL ENCOUNTER (INPATIENT)
Facility: CLINIC | Age: 35
LOS: 2 days | Discharge: HOME | End: 2020-05-20
Attending: FAMILY MEDICINE | Admitting: FAMILY MEDICINE
Payer: COMMERCIAL

## 2020-05-18 ENCOUNTER — ANESTHESIA EVENT (OUTPATIENT)
Dept: OBGYN | Facility: CLINIC | Age: 35
End: 2020-05-18
Payer: COMMERCIAL

## 2020-05-18 ENCOUNTER — PRENATAL OFFICE VISIT (OUTPATIENT)
Dept: FAMILY MEDICINE | Facility: CLINIC | Age: 35
End: 2020-05-18
Payer: COMMERCIAL

## 2020-05-18 ENCOUNTER — ANCILLARY PROCEDURE (OUTPATIENT)
Dept: ULTRASOUND IMAGING | Facility: CLINIC | Age: 35
End: 2020-05-18
Payer: COMMERCIAL

## 2020-05-18 VITALS
RESPIRATION RATE: 18 BRPM | SYSTOLIC BLOOD PRESSURE: 124 MMHG | WEIGHT: 241 LBS | BODY MASS INDEX: 37.97 KG/M2 | DIASTOLIC BLOOD PRESSURE: 82 MMHG | HEART RATE: 80 BPM | TEMPERATURE: 98.5 F | OXYGEN SATURATION: 97 %

## 2020-05-18 DIAGNOSIS — O09.93 HIGH-RISK PREGNANCY IN THIRD TRIMESTER: ICD-10-CM

## 2020-05-18 DIAGNOSIS — Z98.891 S/P CESAREAN SECTION: ICD-10-CM

## 2020-05-18 DIAGNOSIS — O09.523 MULTIGRAVIDA OF ADVANCED MATERNAL AGE IN THIRD TRIMESTER: ICD-10-CM

## 2020-05-18 DIAGNOSIS — Z01.818 PREOP GENERAL PHYSICAL EXAM: Primary | ICD-10-CM

## 2020-05-18 DIAGNOSIS — Z98.891 S/P CESAREAN SECTION: Primary | ICD-10-CM

## 2020-05-18 LAB
ABO + RH BLD: NORMAL
ABO + RH BLD: NORMAL
BLD GP AB SCN SERPL QL: NORMAL
BLOOD BANK CMNT PATIENT-IMP: NORMAL
HGB BLD-MCNC: 13.3 G/DL (ref 11.7–15.7)
SARS-COV-2 PCR COMMENT: NORMAL
SARS-COV-2 RNA SPEC QL NAA+PROBE: NEGATIVE
SARS-COV-2 RNA SPEC QL NAA+PROBE: NORMAL
SPECIMEN EXP DATE BLD: NORMAL
SPECIMEN SOURCE: NORMAL
SPECIMEN SOURCE: NORMAL

## 2020-05-18 PROCEDURE — 86850 RBC ANTIBODY SCREEN: CPT | Performed by: FAMILY MEDICINE

## 2020-05-18 PROCEDURE — 86900 BLOOD TYPING SEROLOGIC ABO: CPT | Performed by: FAMILY MEDICINE

## 2020-05-18 PROCEDURE — 25000132 ZZH RX MED GY IP 250 OP 250 PS 637: Performed by: NURSE ANESTHETIST, CERTIFIED REGISTERED

## 2020-05-18 PROCEDURE — 36000058 ZZH SURGERY LEVEL 3 EA 15 ADDTL MIN: Performed by: FAMILY MEDICINE

## 2020-05-18 PROCEDURE — 99214 OFFICE O/P EST MOD 30 MIN: CPT | Performed by: FAMILY MEDICINE

## 2020-05-18 PROCEDURE — 25000125 ZZHC RX 250: Performed by: FAMILY MEDICINE

## 2020-05-18 PROCEDURE — 25000125 ZZHC RX 250: Performed by: NURSE ANESTHETIST, CERTIFIED REGISTERED

## 2020-05-18 PROCEDURE — 25800030 ZZH RX IP 258 OP 636: Performed by: FAMILY MEDICINE

## 2020-05-18 PROCEDURE — 12000000 ZZH R&B MED SURG/OB

## 2020-05-18 PROCEDURE — 25800030 ZZH RX IP 258 OP 636: Performed by: NURSE ANESTHETIST, CERTIFIED REGISTERED

## 2020-05-18 PROCEDURE — 25000132 ZZH RX MED GY IP 250 OP 250 PS 637: Performed by: FAMILY MEDICINE

## 2020-05-18 PROCEDURE — 71000014 ZZH RECOVERY PHASE 1 LEVEL 2 FIRST HR: Performed by: FAMILY MEDICINE

## 2020-05-18 PROCEDURE — 59514 CESAREAN DELIVERY ONLY: CPT | Mod: 80 | Performed by: FAMILY MEDICINE

## 2020-05-18 PROCEDURE — 37000009 ZZH ANESTHESIA TECHNICAL FEE, EACH ADDTL 15 MIN: Performed by: FAMILY MEDICINE

## 2020-05-18 PROCEDURE — 85018 HEMOGLOBIN: CPT | Performed by: FAMILY MEDICINE

## 2020-05-18 PROCEDURE — 36000056 ZZH SURGERY LEVEL 3 1ST 30 MIN: Performed by: FAMILY MEDICINE

## 2020-05-18 PROCEDURE — 25000128 H RX IP 250 OP 636: Performed by: FAMILY MEDICINE

## 2020-05-18 PROCEDURE — 25000128 H RX IP 250 OP 636: Performed by: NURSE ANESTHETIST, CERTIFIED REGISTERED

## 2020-05-18 PROCEDURE — 27110028 ZZH OR GENERAL SUPPLY NON-STERILE: Performed by: FAMILY MEDICINE

## 2020-05-18 PROCEDURE — 86901 BLOOD TYPING SEROLOGIC RH(D): CPT | Performed by: FAMILY MEDICINE

## 2020-05-18 PROCEDURE — 27210794 ZZH OR GENERAL SUPPLY STERILE: Performed by: FAMILY MEDICINE

## 2020-05-18 PROCEDURE — 87635 SARS-COV-2 COVID-19 AMP PRB: CPT | Performed by: FAMILY MEDICINE

## 2020-05-18 PROCEDURE — 59510 CESAREAN DELIVERY: CPT | Performed by: FAMILY MEDICINE

## 2020-05-18 PROCEDURE — 37000008 ZZH ANESTHESIA TECHNICAL FEE, 1ST 30 MIN: Performed by: FAMILY MEDICINE

## 2020-05-18 PROCEDURE — 86780 TREPONEMA PALLIDUM: CPT | Performed by: FAMILY MEDICINE

## 2020-05-18 RX ORDER — EPHEDRINE SULFATE 50 MG/ML
5 INJECTION, SOLUTION INTRAMUSCULAR; INTRAVENOUS; SUBCUTANEOUS
Status: DISCONTINUED | OUTPATIENT
Start: 2020-05-18 | End: 2020-05-19 | Stop reason: CLARIF

## 2020-05-18 RX ORDER — OXYTOCIN 10 [USP'U]/ML
INJECTION, SOLUTION INTRAMUSCULAR; INTRAVENOUS PRN
Status: DISCONTINUED | OUTPATIENT
Start: 2020-05-18 | End: 2020-05-19 | Stop reason: CLARIF

## 2020-05-18 RX ORDER — ONDANSETRON 2 MG/ML
4 INJECTION INTRAMUSCULAR; INTRAVENOUS EVERY 6 HOURS PRN
Status: DISCONTINUED | OUTPATIENT
Start: 2020-05-18 | End: 2020-05-18

## 2020-05-18 RX ORDER — BUPIVACAINE HYDROCHLORIDE 2.5 MG/ML
INJECTION, SOLUTION EPIDURAL; INFILTRATION; INTRACAUDAL PRN
Status: DISCONTINUED | OUTPATIENT
Start: 2020-05-18 | End: 2020-05-18

## 2020-05-18 RX ORDER — ACETAMINOPHEN 325 MG/1
975 TABLET ORAL EVERY 6 HOURS
Status: DISCONTINUED | OUTPATIENT
Start: 2020-05-18 | End: 2020-05-20 | Stop reason: HOSPADM

## 2020-05-18 RX ORDER — BUPIVACAINE HYDROCHLORIDE 7.5 MG/ML
INJECTION, SOLUTION INTRASPINAL PRN
Status: DISCONTINUED | OUTPATIENT
Start: 2020-05-18 | End: 2020-05-18

## 2020-05-18 RX ORDER — OXYTOCIN 10 [USP'U]/ML
10 INJECTION, SOLUTION INTRAMUSCULAR; INTRAVENOUS
Status: DISCONTINUED | OUTPATIENT
Start: 2020-05-18 | End: 2020-05-20 | Stop reason: HOSPADM

## 2020-05-18 RX ORDER — MODIFIED LANOLIN
OINTMENT (GRAM) TOPICAL
Status: DISCONTINUED | OUTPATIENT
Start: 2020-05-18 | End: 2020-05-20 | Stop reason: HOSPADM

## 2020-05-18 RX ORDER — KETOROLAC TROMETHAMINE 30 MG/ML
30 INJECTION, SOLUTION INTRAMUSCULAR; INTRAVENOUS EVERY 6 HOURS
Status: DISCONTINUED | OUTPATIENT
Start: 2020-05-18 | End: 2020-05-19 | Stop reason: CLARIF

## 2020-05-18 RX ORDER — CEFAZOLIN SODIUM 1 G/3ML
1 INJECTION, POWDER, FOR SOLUTION INTRAMUSCULAR; INTRAVENOUS SEE ADMIN INSTRUCTIONS
Status: DISCONTINUED | OUTPATIENT
Start: 2020-05-18 | End: 2020-05-19

## 2020-05-18 RX ORDER — SIMETHICONE 80 MG
80 TABLET,CHEWABLE ORAL 4 TIMES DAILY PRN
Status: DISCONTINUED | OUTPATIENT
Start: 2020-05-18 | End: 2020-05-20 | Stop reason: HOSPADM

## 2020-05-18 RX ORDER — OXYTOCIN/0.9 % SODIUM CHLORIDE 30/500 ML
100 PLASTIC BAG, INJECTION (ML) INTRAVENOUS CONTINUOUS
Status: DISCONTINUED | OUTPATIENT
Start: 2020-05-18 | End: 2020-05-19 | Stop reason: CLARIF

## 2020-05-18 RX ORDER — NALOXONE HYDROCHLORIDE 0.4 MG/ML
.1-.4 INJECTION, SOLUTION INTRAMUSCULAR; INTRAVENOUS; SUBCUTANEOUS
Status: DISCONTINUED | OUTPATIENT
Start: 2020-05-18 | End: 2020-05-18

## 2020-05-18 RX ORDER — CITRIC ACID/SODIUM CITRATE 334-500MG
30 SOLUTION, ORAL ORAL
Status: COMPLETED | OUTPATIENT
Start: 2020-05-18 | End: 2020-05-18

## 2020-05-18 RX ORDER — HYDROCORTISONE 2.5 %
CREAM (GRAM) TOPICAL 3 TIMES DAILY PRN
Status: DISCONTINUED | OUTPATIENT
Start: 2020-05-18 | End: 2020-05-20 | Stop reason: HOSPADM

## 2020-05-18 RX ORDER — IBUPROFEN 800 MG/1
800 TABLET, FILM COATED ORAL EVERY 6 HOURS
Status: DISCONTINUED | OUTPATIENT
Start: 2020-05-19 | End: 2020-05-19

## 2020-05-18 RX ORDER — CEFAZOLIN SODIUM 2 G/100ML
2 INJECTION, SOLUTION INTRAVENOUS
Status: COMPLETED | OUTPATIENT
Start: 2020-05-18 | End: 2020-05-18

## 2020-05-18 RX ORDER — NALOXONE HYDROCHLORIDE 0.4 MG/ML
.1-.4 INJECTION, SOLUTION INTRAMUSCULAR; INTRAVENOUS; SUBCUTANEOUS
Status: DISCONTINUED | OUTPATIENT
Start: 2020-05-18 | End: 2020-05-19 | Stop reason: CLARIF

## 2020-05-18 RX ORDER — DIPHENHYDRAMINE HYDROCHLORIDE 50 MG/ML
25 INJECTION INTRAMUSCULAR; INTRAVENOUS EVERY 6 HOURS PRN
Status: DISCONTINUED | OUTPATIENT
Start: 2020-05-18 | End: 2020-05-20 | Stop reason: HOSPADM

## 2020-05-18 RX ORDER — DIPHENHYDRAMINE HCL 25 MG
25 CAPSULE ORAL EVERY 6 HOURS PRN
Status: DISCONTINUED | OUTPATIENT
Start: 2020-05-18 | End: 2020-05-20 | Stop reason: HOSPADM

## 2020-05-18 RX ORDER — SODIUM CHLORIDE, SODIUM LACTATE, POTASSIUM CHLORIDE, CALCIUM CHLORIDE 600; 310; 30; 20 MG/100ML; MG/100ML; MG/100ML; MG/100ML
INJECTION, SOLUTION INTRAVENOUS CONTINUOUS
Status: DISCONTINUED | OUTPATIENT
Start: 2020-05-18 | End: 2020-05-19

## 2020-05-18 RX ORDER — OXYCODONE HYDROCHLORIDE 5 MG/1
5 TABLET ORAL EVERY 4 HOURS PRN
Status: DISCONTINUED | OUTPATIENT
Start: 2020-05-18 | End: 2020-05-20 | Stop reason: HOSPADM

## 2020-05-18 RX ORDER — OXYTOCIN/0.9 % SODIUM CHLORIDE 30/500 ML
340 PLASTIC BAG, INJECTION (ML) INTRAVENOUS CONTINUOUS PRN
Status: DISCONTINUED | OUTPATIENT
Start: 2020-05-18 | End: 2020-05-20 | Stop reason: HOSPADM

## 2020-05-18 RX ORDER — AMOXICILLIN 250 MG
2 CAPSULE ORAL 2 TIMES DAILY
Status: DISCONTINUED | OUTPATIENT
Start: 2020-05-18 | End: 2020-05-20 | Stop reason: HOSPADM

## 2020-05-18 RX ORDER — LIDOCAINE HYDROCHLORIDE 10 MG/ML
INJECTION, SOLUTION EPIDURAL; INFILTRATION; INTRACAUDAL; PERINEURAL
Status: DISCONTINUED
Start: 2020-05-18 | End: 2020-05-18 | Stop reason: HOSPADM

## 2020-05-18 RX ORDER — ONDANSETRON 2 MG/ML
4 INJECTION INTRAMUSCULAR; INTRAVENOUS EVERY 6 HOURS PRN
Status: DISCONTINUED | OUTPATIENT
Start: 2020-05-18 | End: 2020-05-19 | Stop reason: CLARIF

## 2020-05-18 RX ORDER — MORPHINE SULFATE 1 MG/ML
INJECTION, SOLUTION EPIDURAL; INTRATHECAL; INTRAVENOUS PRN
Status: DISCONTINUED | OUTPATIENT
Start: 2020-05-18 | End: 2020-05-18

## 2020-05-18 RX ORDER — TRANEXAMIC ACID 10 MG/ML
1 INJECTION, SOLUTION INTRAVENOUS EVERY 30 MIN PRN
Status: DISCONTINUED | OUTPATIENT
Start: 2020-05-18 | End: 2020-05-20 | Stop reason: HOSPADM

## 2020-05-18 RX ORDER — AMOXICILLIN 250 MG
1 CAPSULE ORAL 2 TIMES DAILY
Status: DISCONTINUED | OUTPATIENT
Start: 2020-05-18 | End: 2020-05-20 | Stop reason: HOSPADM

## 2020-05-18 RX ORDER — ONDANSETRON 4 MG/1
4 TABLET, ORALLY DISINTEGRATING ORAL EVERY 6 HOURS PRN
Status: DISCONTINUED | OUTPATIENT
Start: 2020-05-18 | End: 2020-05-20 | Stop reason: HOSPADM

## 2020-05-18 RX ORDER — BISACODYL 10 MG
10 SUPPOSITORY, RECTAL RECTAL DAILY PRN
Status: DISCONTINUED | OUTPATIENT
Start: 2020-05-20 | End: 2020-05-20 | Stop reason: HOSPADM

## 2020-05-18 RX ORDER — DEXTROSE, SODIUM CHLORIDE, SODIUM LACTATE, POTASSIUM CHLORIDE, AND CALCIUM CHLORIDE 5; .6; .31; .03; .02 G/100ML; G/100ML; G/100ML; G/100ML; G/100ML
INJECTION, SOLUTION INTRAVENOUS CONTINUOUS
Status: DISCONTINUED | OUTPATIENT
Start: 2020-05-18 | End: 2020-05-19 | Stop reason: CLARIF

## 2020-05-18 RX ORDER — LIDOCAINE 40 MG/G
CREAM TOPICAL
Status: DISCONTINUED | OUTPATIENT
Start: 2020-05-18 | End: 2020-05-20 | Stop reason: HOSPADM

## 2020-05-18 RX ORDER — SCOLOPAMINE TRANSDERMAL SYSTEM 1 MG/1
1 PATCH, EXTENDED RELEASE TRANSDERMAL ONCE
Status: DISCONTINUED | OUTPATIENT
Start: 2020-05-18 | End: 2020-05-20 | Stop reason: HOSPADM

## 2020-05-18 RX ORDER — ONDANSETRON 2 MG/ML
INJECTION INTRAMUSCULAR; INTRAVENOUS PRN
Status: DISCONTINUED | OUTPATIENT
Start: 2020-05-18 | End: 2020-05-18

## 2020-05-18 RX ORDER — OXYTOCIN/0.9 % SODIUM CHLORIDE 30/500 ML
PLASTIC BAG, INJECTION (ML) INTRAVENOUS PRN
Status: DISCONTINUED | OUTPATIENT
Start: 2020-05-18 | End: 2020-05-18

## 2020-05-18 RX ADMIN — BUPIVACAINE HYDROCHLORIDE 20 ML: 2.5 INJECTION, SOLUTION EPIDURAL; INFILTRATION; INTRACAUDAL; PERINEURAL at 12:49

## 2020-05-18 RX ADMIN — CEFAZOLIN SODIUM 2 G: 2 INJECTION, SOLUTION INTRAVENOUS at 11:12

## 2020-05-18 RX ADMIN — KETOROLAC TROMETHAMINE 30 MG: 30 INJECTION, SOLUTION INTRAMUSCULAR at 17:34

## 2020-05-18 RX ADMIN — ACETAMINOPHEN 975 MG: 325 TABLET, FILM COATED ORAL at 14:18

## 2020-05-18 RX ADMIN — Medication: at 23:50

## 2020-05-18 RX ADMIN — PHENYLEPHRINE HYDROCHLORIDE 0.2 MCG/KG/MIN: 10 INJECTION INTRAVENOUS at 11:50

## 2020-05-18 RX ADMIN — SENNOSIDES AND DOCUSATE SODIUM 1 TABLET: 8.6; 5 TABLET ORAL at 14:18

## 2020-05-18 RX ADMIN — BUPIVACAINE HYDROCHLORIDE IN DEXTROSE 1.8 ML: 7.5 INJECTION, SOLUTION SUBARACHNOID at 11:45

## 2020-05-18 RX ADMIN — Medication 100 ML: at 12:10

## 2020-05-18 RX ADMIN — DIPHENHYDRAMINE HYDROCHLORIDE 25 MG: 25 CAPSULE ORAL at 22:03

## 2020-05-18 RX ADMIN — BUPIVACAINE HYDROCHLORIDE 20 ML: 2.5 INJECTION, SOLUTION EPIDURAL; INFILTRATION; INTRACAUDAL; PERINEURAL at 12:45

## 2020-05-18 RX ADMIN — SODIUM CITRATE AND CITRIC ACID MONOHYDRATE 30 ML: 500; 334 SOLUTION ORAL at 11:12

## 2020-05-18 RX ADMIN — SODIUM CHLORIDE, POTASSIUM CHLORIDE, SODIUM LACTATE AND CALCIUM CHLORIDE: 600; 310; 30; 20 INJECTION, SOLUTION INTRAVENOUS at 11:10

## 2020-05-18 RX ADMIN — ONDANSETRON 4 MG: 2 INJECTION INTRAMUSCULAR; INTRAVENOUS at 11:40

## 2020-05-18 RX ADMIN — SENNOSIDES AND DOCUSATE SODIUM 1 TABLET: 8.6; 5 TABLET ORAL at 23:49

## 2020-05-18 RX ADMIN — Medication 0.2 MG: at 11:45

## 2020-05-18 RX ADMIN — Medication 100 ML/HR: at 14:20

## 2020-05-18 RX ADMIN — ACETAMINOPHEN 975 MG: 325 TABLET, FILM COATED ORAL at 19:18

## 2020-05-18 RX ADMIN — KETOROLAC TROMETHAMINE 30 MG: 30 INJECTION, SOLUTION INTRAMUSCULAR at 23:49

## 2020-05-18 ASSESSMENT — PAIN SCALES - GENERAL: PAINLEVEL: NO PAIN (0)

## 2020-05-18 NOTE — PROGRESS NOTES
Report was given to LAYA Angel RN & Sandie RN at this time, on coming RNs have no questions at this time.

## 2020-05-18 NOTE — ANESTHESIA PROCEDURE NOTES
Procedure note : TAP  Staff -       CRNA: Genie Vigil APRN CRNA    Performed By: CRNA        Pre-Procedure    Location: post-op      Pre-Anesthestic Checklist: patient identified, IV checked, site marked, risks and benefits discussed, informed consent, monitors and equipment checked, pre-op evaluation, at physician/surgeon's request and post-op pain management    Timeout  Correct Patient: Yes   Correct Procedure: Yes   Correct Site: Yes   Correct Laterality: Yes   Correct Position: Yes   Site Marked: Yes   .   Procedure Documentation    .    Procedure: TAP, .   Patient Position:supine Local skin infiltrated with 1 mL of 1% lidocaine.    Ultrasound used to identify targeted nerve, plexus, or vascular marker and placed a needle adjacent to it., Ultrasound was used to visualize the spread of the anesthetic in close proximity to the above stated nerve. A permanent image is entered into the patient's record.  Patient Prep/Sterile Barriers; mask, sterile gloves, chlorhexidine gluconate and isopropyl alcohol.  Nerve Stim: Initial Level 0.05 mA. Lowest motor response mA..  Needle: insulated   Needle Gauge: 22.  Needle Length (millimeters) 100  Insertion Method: Single Shot.        Assessment/Narrative  Injection made incrementally with aspirations every 5 mL..  The placement was negative for: blood aspirated, painful injection and site bleeding.  Bolus given via needle..   Secured via.   Complications: none. Comments:  Pt tolerated procedure well. Will follow as necessary.

## 2020-05-18 NOTE — ANESTHESIA PREPROCEDURE EVALUATION
"Anesthesia Pre-Procedure Evaluation    Patient: Amy Moore   MRN: [id number] : 1985          Preoperative Diagnosis: High-risk pregnancy in third trimester [O09.93]  Multigravida of advanced maternal age in third trimester [O09.523]    Procedure(s):   SECTION    Past Medical History:   Diagnosis Date     Pelvic pain in female 2016     Slow transit constipation 2016     Weight gain 2016     Past Surgical History:   Procedure Laterality Date      SECTION N/A 2016    Procedure:  SECTION;  Surgeon: Merrick Justice MD;  Location:  L[facility]D       Anesthesia Evaluation     . Pt has had prior anesthetic. Type: Regional           ROS/MED HX    ENT/Pulmonary:  - neg pulmonary ROS     Neurologic:  - neg neurologic ROS     Cardiovascular:  - neg cardiovascular ROS   (+) ----. : . . . :. . No previous cardiac testing       METS/Exercise Tolerance:  >4 METS   Hematologic:  - neg hematologic  ROS       Musculoskeletal: Comment: Back and neck pain from previous skiing accident - neg musculoskeletal ROS       GI/Hepatic:  - neg GI/hepatic ROS       Renal/Genitourinary:  - ROS Renal section negative       Endo:  - neg endo ROS       Psychiatric:  - neg psychiatric ROS       Infectious Disease:  - neg infectious disease ROS       Malignancy:      - no malignancy   Other: Comment: Low back \"right side sciatica\" after snow board injury   (+) Possibly pregnant C-spine cleared: No, H/O Chronic Pain,  - neg other ROS                      Physical Exam  Normal systems: cardiovascular, pulmonary and dental    Airway   Mallampati: II  TM distance: >3 FB    Dental     Cardiovascular   Rhythm and rate: regular and normal      Pulmonary    breath sounds clear to auscultation            Lab Results   Component Value Date    WBC 7.4 10/21/2019    HGB 14.1 2020    HCT 37.2 10/21/2019     10/21/2019    GLC neg 2014    TSH 2.35 2019    HCG Positive (A) 10/21/2019 " "      Preop Vitals  BP Readings from Last 3 Encounters:   05/18/20 127/73   05/18/20 124/82   05/11/20 118/82    Pulse Readings from Last 3 Encounters:   05/18/20 96   05/18/20 80   05/11/20 114      Resp Readings from Last 3 Encounters:   05/18/20 18   05/18/20 18   05/11/20 18    SpO2 Readings from Last 3 Encounters:   05/18/20 97%   05/11/20 97%   05/07/20 99%      Temp Readings from Last 1 Encounters:   05/18/20 97.7  F (36.5  C) (Oral)    Ht Readings from Last 1 Encounters:   05/07/20 1.697 m (5' 6.8\")      Wt Readings from Last 1 Encounters:   05/18/20 109.3 kg (241 lb)    Estimated body mass index is 37.97 kg/m  as calculated from the following:    Height as of 5/7/20: 1.697 m (5' 6.8\").    Weight as of an earlier encounter on 5/18/20: 109.3 kg (241 lb).       Anesthesia Plan      History & Physical Review  History and physical reviewed and following examination; no interval change.    ASA Status:  2 emergent.    NPO Status:  > 8 hours    Plan for Spinal and Peripheral Nerve Block   PONV prophylaxis:  Ondansetron (or other 5HT-3)         Postoperative Care  Postoperative pain management:  Multi-modal analgesia.      Consents  Anesthetic plan, risks, benefits and alternatives discussed with:  Patient..                 NAOMY Noble CRNA  "

## 2020-05-18 NOTE — OR NURSING
Transfer from  PACU to Room 261  Transferred to bed via hospital bed    S: 36 y/o F  S/P        Anesthesia Type:  spinal       Surgeon:  Dr. Nguyễn       Allergies:  See Medication Reconciliation Record       DNR: NO      B:  Pertinent Medical History:   Past Medical History:   Diagnosis Date     Pelvic pain in female 2016     Slow transit constipation 2016     Weight gain 2016                 Surgical History:    Past Surgical History:   Procedure Laterality Date      SECTION N/A 2016    Procedure:  SECTION;  Surgeon: Merrick Justice MD;  Location: [facility]         A:  EBL: 804cc        IVF:  1500cc        UOP:  [place] catheter, OB RN will measure        NPO:  ___Yes X___No         Vomiting:  ___Yes X___No         Drainage: none        Skin Integrity: intact except lower abdominal incision        RFO: ___Yes_X__No (identify item if present)        SSI Patient?  ___Yes_X_No (if yes, see checklist for actions)        Brace/sling/equipment:  ___YesX__No (identify item if present)         See PACU record for ongoing assessment, vital signs and pain assessment.    R: Post-Op vitals and assessments as ordered/indicated per patient's condition.       Follow Post-Op orders and notify Physician prn.       Continue to involve patient/family in plan of care and discharge planning.       Reinforce Pre-Operative education.       Implement skin safety interventions as appropriate.    Name: Penny Carr RN

## 2020-05-18 NOTE — PROGRESS NOTES
S: Triage Arrival  B: Amy is a 35 y.o.  @ 38w 0d who presents from clinic with regular contractions  A: EFM applied. FHT's 130 with moderate variability, accels present, no decels noted on strip. Contractions 3.5-4.5, denies bleeding or leaking of fluid.   R: Will notify MD to obtain further orders after monitoring.

## 2020-05-18 NOTE — ANESTHESIA CARE TRANSFER NOTE
Patient: Amy Moore    Procedure(s):   SECTION    Diagnosis: High-risk pregnancy in third trimester [O09.93]  Multigravida of advanced maternal age in third trimester [O09.523]  Diagnosis Additional Information: No value filed.    Anesthesia Type:   Spinal, Peripheral Nerve Block     Note:  Airway :Room Air  Patient transferred to:PACU  Handoff Report: Identifed the Patient, Identified the Reponsible Provider, Reviewed the pertinent medical history, Discussed the surgical course, Reviewed Intra-OP anesthesia mangement and issues during anesthesia, Set expectations for post-procedure period and Allowed opportunity for questions and acknowledgement of understanding      Vitals: (Last set prior to Anesthesia Care Transfer)    CRNA VITALS  2020 1222 - 2020 1259      2020             Resp Rate (observed):  19    EKG:  Sinus rhythm                Electronically Signed By: NAOMY Noble CRNA  May 18, 2020  12:59 PM

## 2020-05-18 NOTE — PROGRESS NOTES
[address] [address]  [phone]  Dept: [phone]    PRE-OP EVALUATION:  Today's date: 2020    Amy Moore (: 1985) presents for pre-operative evaluation assessment as requested by Dr. Justice.  She requires evaluation and anesthesia risk assessment prior to undergoing surgery/procedure for treatment of  .    Proposed Surgery/ Procedure:   Date of Surgery/ Procedure: 2020  Time of Surgery/ Procedure:   Hospital/Surgical Facility: [facility]  Fax number for surgical facility:   Primary Physician: Mello Nguyễn  Type of Anesthesia Anticipated: Spinal    Patient has a Health Care Directive or Living Will:  NO    1. NO - Do you have a history of heart attack, stroke, stent, bypass or surgery on an artery in the head, neck, heart or legs?  2. NO - Do you ever have any pain or discomfort in your chest?  3. NO - Do you have a history of  Heart Failure?  4. NO - Are you troubled by shortness of breath when: walking on the level, up a slight hill or at night?  5. NO - Do you currently have a cold, bronchitis or other respiratory infection?  6. YES - DO YOU HAVE A COUGH, SHORTNESS OF BREATH OR WHEEZING?   7. NO - Do you sometimes get pains in the calves of your legs when you walk?  8. NO - Do you or anyone in your family have previous history of blood clots?  9. NO - Do you or does anyone in your family have a serious bleeding problem such as prolonged bleeding following surgeries or cuts?  10. NO - Have you ever had problems with anemia or been told to take iron pills?  11. NO - Have you had any abnormal blood loss such as black, tarry or bloody stools, or abnormal vaginal bleeding?  12. NO - Have you ever had a blood transfusion?  13. NO - Have you or any of your relatives ever had problems with anesthesia?  14. NO - Do you have sleep apnea, excessive snoring or daytime drowsiness?  15. NO - Do you have any prosthetic heart  valves?  16. NO - Do you have prosthetic joints?  17. YES - IS THERE ANY CHANCE THAT YOU MAY BE PREGNANT? YES SHE IS PREGNANT      HPI:     HPI related to upcoming procedure: RLTCS for 2020      See problem list for active medical problems.  Problems all longstanding and stable, except as noted/documented.  See ROS for pertinent symptoms related to these conditions.      MEDICAL HISTORY:     Patient Active Problem List    Diagnosis Date Noted     Multigravida of advanced maternal age in third trimester 2020     Priority: Medium     Added automatically from request for surgery [id number]       High-risk pregnancy in third trimester 2020     Priority: Medium     Added automatically from request for surgery [id number]       Encounter for triage in pregnant patient 2020     Priority: Medium     Carpal tunnel syndrome on right 2020     Priority: Medium     High-risk pregnancy 10/30/2019     Priority: Medium     Maternal care due to low transverse uterine scar from previous  delivery 10/30/2019     Priority: Medium     AMA (advanced maternal age) multigravida 35+ 10/30/2019     Priority: Medium     Internal hemorrhoids without complication 2019     Priority: Medium     LORI (generalized anxiety disorder) 2017     Priority: Medium     Adjustment disorder with depressed mood 2017     Priority: Medium     S/P  section 2016     Priority: Medium     Slow transit constipation 2016     Priority: Medium     Weight gain 2016     Priority: Medium     Pelvic pain in female 2016     Priority: Medium      Past Medical History:   Diagnosis Date     Pelvic pain in female 2016     Slow transit constipation 2016     Weight gain 2016     Past Surgical History:   Procedure Laterality Date      SECTION N/A 2016    Procedure:  SECTION;  Surgeon: Merrick Justice MD;  Location:  [facility]D     No current outpatient medications on  file.     OTC products: None, except as noted above    No Known Allergies   Latex Allergy: NO    Social History     Tobacco Use     Smoking status: Former Smoker     Smokeless tobacco: Never Used     Tobacco comment: socially in early 20's   Substance Use Topics     Alcohol use: No     Alcohol/week: 0.0 standard drinks     Comment: socially     History   Drug Use No       REVIEW OF SYSTEMS:   Constitutional, neuro, ENT, endocrine, pulmonary, cardiac, gastrointestinal, genitourinary, musculoskeletal, integument and psychiatric systems are negative, except as otherwise noted.    EXAM:   /82   Pulse 80   Temp 98.5  F (36.9  C) (Temporal)   Resp 18   Wt 109.3 kg (241 lb)   LMP  (LMP Unknown)   SpO2 97%   BMI 37.97 kg/m      GENERAL APPEARANCE: healthy, alert and no distress     EYES: EOMI, PERRL     HENT: ear canals and TM's normal and nose and mouth without ulcers or lesions     NECK: no adenopathy, no asymmetry, masses, or scars and thyroid normal to palpation     RESP: lungs clear to auscultation - no rales, rhonchi or wheezes     CV: regular rates and rhythm, normal S1 S2, no S3 or S4 and no murmur, click or rub     ABDOMEN: gravid, cephalic in position     MS: 1+ pitting edema bilaterally     SKIN: no suspicious lesions or rashes     NEURO: Normal strength and tone, sensory exam grossly normal, mentation intact and speech normal     PSYCH: mentation appears normal. and affect normal/bright     LYMPHATICS: No cervical adenopathy    DIAGNOSTICS:     Labs Drawn and in Process:   Unresulted Labs Ordered in the Past 30 Days of this Admission     No orders found from 4/18/2020 to 5/19/2020.          Recent Labs   Lab Test 05/07/20  1228 02/17/20  1736 10/21/19  1801  03/28/16  1756   HGB 14.1 12.8 12.7   < > 13.6   PLT  --   --  330  --  322    < > = values in this interval not displayed.        IMPRESSION:   Reason for surgery/procedure: Term IUP at 38 wks gestation with previous uterine scar, now  mary actively every 2-3 minutes  Diagnosis/reason for consult: Preoperative evaluation for surgery and anesthesia      The proposed surgical procedure is considered INTERMEDIATE risk.    REVISED CARDIAC RISK INDEX  The patient has the following serious cardiovascular risks for perioperative complications such as (MI, PE, VFib and 3  AV Block):  No serious cardiac risks  INTERPRETATION: 0 risks: Class I (very low risk - 0.4% complication rate)    The patient has the following additional risks for perioperative complications:  No identified additional risks      ICD-10-CM    1. Preop general physical exam  Z01.818    2. Multigravida of advanced maternal age in third trimester  O09.523    3. High-risk pregnancy in third trimester  O09.93    4. S/P  section  Z98.891        RECOMMENDATIONS:   APPROVAL GIVEN to proceed with proposed procedure, without further diagnostic evaluation       Signed Electronically by: Mello Nguyễn MD    Copy of this evaluation report is provided to requesting physician.    [facility] Preop Guidelines    Revised Cardiac Risk Index      We discussed the potential complications of a repeat  delivery.  We reviewed the entire consent form and the risks involved.  In general with a repeat , the risks are similar to that of a primary but blood loss can be more or less depending on the amount of scarring from her previous surgery. This is also true for potential injury to bowel bladder and other internal organ, depending on the amount of scarring she has from her previous surgery.     The other possible complications arising from repeat  include but are not limited to pain, bleeding, infection, injury to the fetus, injury to the maternal bowel, bladder or other adjacent organs, possible nerve damage or temporary loss of limb function or even temporary paralysis, blood clots in the legs or pelvis or the ultimate risk would be loss of life.  With bleeding, if  there is excessive blood loss, there could be the need for transfusion or even the need to do a  hysterectomy.    Regarding blood loss, there can be anywhere from 300 to 3000cc with the average being about 1000cc during a repeat  delivery.  If there is excessive blood loss or hemorrhage with a postpartum bleed or injury to uterine vessels during a  delivery, there may be need for blood transfusion and the inherent risks that come along with that including risk of HIV and hepatitis.  The patient had no further questions and had all of them answered to her satisfaction.     Electronically signed by:  Mello Nguyễn M.D.  2020

## 2020-05-18 NOTE — L&D DELIVERY NOTE
PREOPERATIVE DIAGNOSES:   1. Term intrauterine pregnancy at 38 weeks' gestation.   2. Early labor with contractions q2-3 minutes with a reassuring fetal tracing with moderate variability. She was scheduled for a RLTCS due to a previous uterine scar on 2020 so went to the OR today    POSTOPERATIVE DIAGNOSIS:   1. Term intrauterine pregnancy at 38 weeks' gestation.   2. Early labor with contractions q2-3 minutes with a reassuring fetal tracing with moderate variability. She was scheduled for a RLTCS due to a previous uterine scar on 2020 so went to the OR today    PROCEDURE: Repeat low transverse  section.     SURGEON: Delma   PRIMARY: Delma  ASSISTANT: Ayde Green Mai was asked to assist in the  Section because of the special skill set he/she possess in Myotomy repair, fascial closure, and special skin closure techniques.  Dr. Messer was a vital assistant in all of the above listed procedures. His/Her skills allowed us to shorten the length of the procedure considerably, which has been shown to decrease risk of infection, decrease post operative morbidity, and improve outcomes.     ANESTHESIA: Spinal with Genie Vigil CRNA.     INDICATIONS: Multiparous patient who is at 38 0/7 weeks' gestation. Essentially she was scheduled for a RLTCS on 2020 and was in for her preop evaluation and was mary regularly so I sent her up to L&D for an evaluation.  Her contractions increased in strength and frequency to every 2 minutes so she was added onto the OR schedule for her CS today.    PROCEDURE: Amy was taken to the operating room where spinal anesthesia was placed.  She was then laid in the supine position where she was prepped and draped in the routine sterile fashion after a Wesley catheter was placed. Under adequate anesthesia, a Pfannenstiel incision was made with a #15 blade. The incision was extended down through the subcutaneous tissue with blunt and sharp dissection, cauterizing  any bleeding as we went. The midline of the fascia was scored with a #15 blade and carried transversely in both directions with a curved Hannah scissors. The fascia was then dissected off the anterior surface of the rectus muscle inferiorly and superiorly with both sharp and blunt dissection. Again, any bleeding was cauterized and taken care of at the time. The rectus muscles were then  in the midline and entered with sharp dissection. This was then extended with blunt dissection in both directions until adequate space was appreciated. A bladder flap was created by tenting the uterovesicular peritoneum with DeBakeys and incising with a Metzenbaum scissors. The Odell O self-retaining retractor was then inserted. With good exposure, we then incised the uterus with a #15 blade, carefully getting down to the final layer. This was then broken through with a Elvira and then the uterine incision was extended with finger dissection in both directions transversely. The presenting part was identified; it was then delivered through the abdominal incision atraumatically. The shoulders and remainder of the body were then delivered and spontaneous crying and respirations were noted with this viable . Suctioning through the mouth and nares was done at this time. The cord was clamped x2 and cut and the baby brought over to the warming table and nursing staff by the assistant. Cord bloods were then obtained.     The placenta was then removed manually intact. The cut surface of the uterus was then isolated with Horner clamps. The uterine incision was closed using 0 Vicryl in a running locked fashion, followed by a second imbricating layer of 0 Monocryl. Any bleeding was controlled with figure-of-eight sutures. Hemostasis was excellent.     The bladder flap was not repaired.     Irrigation was then done to remove any blood clots and debris. On reinspection, the uterine scar was dry and clear of any bleeding.     The  parietal peritoneum was then closed using 0 Vicryl in a running fashion. The rectus muscles were brought together to the midline with 0 Vicryl interrupted sutures.     The corners of the fascia on either end were isolated with Kocher clamps. The fascia was then closed with 0 Vicryl in a running fashion. The subcutaneious tissue was closed by using 3 interrupted sutures of 2-0 plain catgut suture. The skin was then closed with Insorb resorbable staples and sealed with Exofin glue. No dressing was needed. .     VARIATIONS: None .     ESTIMATED BLOOD LOSS: 850 mL.     TOTAL FLUIDS IN: 700 ml of LR.     TOTAL URINE OUTPUT: About 150 mL of clear urine.     Both mom and this female  with apgars of 9 and 9 at 1 and 5 min respectively, which weighed 8 pounds 7 ounces or 3830 grams were stable upon me leaving the delivery room.     OPERATING ROOM TIMES:   Into the operating room at 1137, time of incision 1200, time of delivery 1209, and time of closure 1225.     Electronically signed by:  Mello Nguyễn M.D.  2020

## 2020-05-18 NOTE — NURSING NOTE
Chief Complaint   Patient presents with     Prenatal Care     Pre-Op Exam     2020      38w0d    MP/MA

## 2020-05-18 NOTE — PROGRESS NOTES
S: Transfer to postpartum  B:Cesarea section at 1209, breast feeding    A: Mother and baby transferred to postpartum unit at 1345 via cart after completion of immediate recovery period. Patient oriented to room. Mother and baby bonding well and in satisfactory condition upon transfer.  R: Anticipate routine postpartum care.

## 2020-05-18 NOTE — PROGRESS NOTES
S:  Section Delivery  B: Repeat  Section at 1209  A: Baby delivered, cord clamping delayed for 60 seconds, then brought to pre- warmed infant warmer. Infant stimulated and dried. Infant then brought to mother and placed skin to skin for bonding. Apgars 9/9. Educated mother on expected feeding readiness cues and encouraged her to observe for feeding cues. Mother informed that breast feeding assistance would be provided.   R: Mother and baby bonding well. Anticipate first feed within the hour.

## 2020-05-18 NOTE — ANESTHESIA PROCEDURE NOTES
Procedure note : intrathecal  Staff -       CRNA: Genie Vigil APRN CRNA    Performed By: CRNA        Pre-Procedure    Location: OR      Pre-Anesthestic Checklist: patient identified, IV checked, risks and benefits discussed, informed consent, monitors and equipment checked and pre-op evaluation    Timeout  Correct Patient: Yes   Correct Procedure: Yes   Correct Site: Yes   Correct Laterality: N/A   Correct Position: Yes   Site Marked: N/A   .   Procedure Documentation    .    Procedure: intrathecal, .   Patient Position:sitting Insertion Site:L3-4  (midline approach)     Patient Prep/Sterile Barriers; mask, sterile gloves, povidone-iodine 7.5% surgical scrub, patient draped.  .  Needle:  Spinal Needle (gauge): 25  Spinal/LP Needle Length (inches): 3.5 # of attempts: 1 and # of redirects:  Introducer used Introducer: 20 G .        Assessment/Narrative  Paresthesias: No.  .  .  clear CSF fluid removed . Time Injected:while sitting   Sensory Level: T4 Comments:  Patient tolerated the above procedure well without difficulty

## 2020-05-18 NOTE — PROGRESS NOTES
"Dr. Nguyễn at bedside assessing pt. Ctxing every couple of minutes. MD will come back to reassess in 30\" & make a decision if repeat CS will need to happen today. Pt has no questions at this time.  "

## 2020-05-18 NOTE — PROGRESS NOTES
Dr. Nguyễn decided to proceed with repeat CS today. Pt was assisted up to the shower at this time.

## 2020-05-19 LAB
HGB BLD-MCNC: 12.3 G/DL (ref 11.7–15.7)
T PALLIDUM AB SER QL: NONREACTIVE

## 2020-05-19 PROCEDURE — 12000000 ZZH R&B MED SURG/OB

## 2020-05-19 PROCEDURE — 36415 COLL VENOUS BLD VENIPUNCTURE: CPT | Performed by: FAMILY MEDICINE

## 2020-05-19 PROCEDURE — 25000132 ZZH RX MED GY IP 250 OP 250 PS 637: Performed by: FAMILY MEDICINE

## 2020-05-19 PROCEDURE — 25000128 H RX IP 250 OP 636: Performed by: FAMILY MEDICINE

## 2020-05-19 PROCEDURE — 85018 HEMOGLOBIN: CPT | Performed by: FAMILY MEDICINE

## 2020-05-19 RX ORDER — IBUPROFEN 800 MG/1
800 TABLET, FILM COATED ORAL EVERY 6 HOURS
Qty: 90 TABLET | Refills: 1 | Status: SHIPPED | OUTPATIENT
Start: 2020-05-19 | End: 2021-03-18

## 2020-05-19 RX ORDER — ACETAMINOPHEN 325 MG/1
975 TABLET ORAL EVERY 6 HOURS
Start: 2020-05-19 | End: 2021-03-18

## 2020-05-19 RX ORDER — OXYCODONE HYDROCHLORIDE 5 MG/1
5 TABLET ORAL EVERY 4 HOURS PRN
Qty: 10 TABLET | Refills: 0 | Status: SHIPPED | OUTPATIENT
Start: 2020-05-19 | End: 2021-03-18

## 2020-05-19 RX ORDER — IBUPROFEN 800 MG/1
800 TABLET, FILM COATED ORAL EVERY 6 HOURS
Status: DISCONTINUED | OUTPATIENT
Start: 2020-05-19 | End: 2020-05-20 | Stop reason: HOSPADM

## 2020-05-19 RX ORDER — AMOXICILLIN 250 MG
1 CAPSULE ORAL 2 TIMES DAILY PRN
Qty: 30 TABLET | Refills: 0 | Status: SHIPPED | OUTPATIENT
Start: 2020-05-19 | End: 2021-03-18

## 2020-05-19 RX ADMIN — ACETAMINOPHEN 975 MG: 325 TABLET, FILM COATED ORAL at 09:00

## 2020-05-19 RX ADMIN — IBUPROFEN 800 MG: 800 TABLET, FILM COATED ORAL at 12:58

## 2020-05-19 RX ADMIN — SENNOSIDES AND DOCUSATE SODIUM 1 TABLET: 8.6; 5 TABLET ORAL at 09:00

## 2020-05-19 RX ADMIN — IBUPROFEN 800 MG: 800 TABLET, FILM COATED ORAL at 18:47

## 2020-05-19 RX ADMIN — ACETAMINOPHEN 975 MG: 325 TABLET, FILM COATED ORAL at 14:40

## 2020-05-19 RX ADMIN — IBUPROFEN 800 MG: 800 TABLET, FILM COATED ORAL at 06:54

## 2020-05-19 RX ADMIN — ACETAMINOPHEN 975 MG: 325 TABLET, FILM COATED ORAL at 01:13

## 2020-05-19 RX ADMIN — ACETAMINOPHEN 975 MG: 325 TABLET, FILM COATED ORAL at 21:20

## 2020-05-19 RX ADMIN — SENNOSIDES AND DOCUSATE SODIUM 1 TABLET: 8.6; 5 TABLET ORAL at 21:20

## 2020-05-19 NOTE — PLAN OF CARE
S: Shift review  B:Amy is a , day 1 post  birth.  A: Stable post-op, incision is glued, no drainage, or redness., Lung sounds-clear, bowel sounds active in all 4 quadrants, independent with mobility, pain control achieved with p.o. pain meds. Handles baby with confidence.BF independently, nipples intact.HGB 12.3  R: Continue with plan of care. Offer pain meds routinely. .Plan Discharge tomorrow.

## 2020-05-19 NOTE — ANESTHESIA POSTPROCEDURE EVALUATION
Patient: Amy Moore    Procedure(s):   SECTION    Diagnosis:High-risk pregnancy in third trimester [O09.93]  Multigravida of advanced maternal age in third trimester [O09.523]  Diagnosis Additional Information: No value filed.    Anesthesia Type:  Spinal, Peripheral Nerve Block    Note:  Anesthesia Post Evaluation    Patient location during evaluation: bedside  Patient participation: Able to fully participate in evaluation  Level of consciousness: awake and alert  Pain management: satisfactory to patient  Airway patency: patent  Cardiovascular status: acceptable  Respiratory status: acceptable and room air  Hydration status: acceptable  PONV: none     Anesthetic complications: None    Comments: Patient had issues standing last night as would get sweaty and light headed.  She also had to be straight catheterized for urine retention.  She is back to normal now.  Patient stated all her motor and sensory functions have returned and I informed her she would likely have some pain at the spinal site which is comparable to a flu shot.  I also advised her to contact the anesthesia department if she had any further concerns or if she had persistent pain or redness at spinal site.  I will follow up with her if needed.        Last vitals:  Vitals:    20 0430 20 0605 20 0800   BP: 100/61  105/56   Pulse: 77  81   Resp: 20  16   Temp:  97.7  F (36.5  C) 97.8  F (36.6  C)   SpO2: 100%  95%         Electronically Signed By: NAOMY Castillo CRNA  May 19, 2020  9:18 AM

## 2020-05-19 NOTE — PROGRESS NOTES
States she is feeling much better. Request Benadryl for itching. Now attempting to breast feed baby. Will ambulate to bathroom and attempt to void again when finished

## 2020-05-19 NOTE — PROGRESS NOTES
Has been up to bathroom. Unable to void. Now independent in room. Straight cathed for 500 ml of light javier urine.

## 2020-05-19 NOTE — PROGRESS NOTES
[facility] Obstetrics Post-Op / Progress Note         Assessment and Plan:    Assessment:   Post-operative day #1  Low transverse repeat  section  L&D complications: High-risk pregnancy in third trimester [O09.93]  Multigravida of advanced maternal age in third trimester [O09.523]      Doing well.  Clean wound without signs of infection.  Normal healing wound.  No immediate surgical complications identified.  No excessive bleeding  Pain well-controlled.      Plan:   Ambulation encouraged  Breast feeding strategies discussed  Lactation consultation  Monitor wound for signs of infection  Pain control measures as needed  Reportable signs and symptoms dicussed with the patient           Interval History:   Doing well.  Pain is controlled.  No fevers.  No history of wound drainage, warmth or significant erythema.  Good appetite.  Denies chest pain, shortness of breath, nausea or vomiting.  Breastfeeding well.          Significant Problems:    None          Review of Systems:    The patient denies any chest pain, shortness of breath, excessive pain, fever, chills, purulent drainage from the wound, nausea or vomiting.          Medications:   All medications related to the patient's surgery have been reviewed          Physical Exam:     All vitals stable  Patient Vitals for the past 8 hrs:   BP Temp Temp src Heart Rate Resp   20 1620 125/65 97.1  F (36.2  C) Oral 97 16     LUNGS:  Clear to auscultation bilaterally, no wheezing, rhonci or rales.  CV:  RRR nl S1/S2 no murmur.  Abdomen: BS+ wound clean and dry  Extremities: trace edema bilaterally            Data:     All laboratory data related to this surgery reviewed  Hemoglobin   Date Value Ref Range Status   2020 12.3 11.7 - 15.7 g/dL Final   2020 13.3 11.7 - 15.7 g/dL Final   2020 14.1 11.7 - 15.7 g/dL Final   2020 12.8 11.7 - 15.7 g/dL Final   10/21/2019 12.7 11.7 - 15.7 g/dL Final     No imaging studies have been  ordered    Electronically signed by:  Mello Nguyễn M.D.  5/19/2020

## 2020-05-19 NOTE — PROGRESS NOTES
Patient assist x2 up to bathroom, did well ambulating to toilet. Upon sitting on toilet unable to void. Patient began feeling flushed/hot, difficult to focus, needing to be told to keep eyes open. Assist x2 back to bed. Patient shaking, warm blankets applied. Encouraged rest at this time.  remains at bedside attentive and supportive.

## 2020-05-19 NOTE — PROGRESS NOTES
"Attempted to get patient up to ambulate to bathroom to try to void status post 5.5 hour simon removal. Had been sitting high fowlers in bed, but felt instantly dizzy when she swung around to sit on edge of bed. Had her take deep breaths and look ahead with no relief. Started to \"feel hot\". VSS. Color pink. Denies nausea. Kept stating she felt \"foggy\". After a good 15 minutes, this feeling did not subside so she was laid back down in bed. She was shaking. Instructed to lay flat for a while and slowly raise the HOB back up and we would attempt again later  "

## 2020-05-20 ENCOUNTER — MYC MEDICAL ADVICE (OUTPATIENT)
Dept: FAMILY MEDICINE | Facility: CLINIC | Age: 35
End: 2020-05-20

## 2020-05-20 VITALS
HEART RATE: 90 BPM | RESPIRATION RATE: 16 BRPM | SYSTOLIC BLOOD PRESSURE: 129 MMHG | DIASTOLIC BLOOD PRESSURE: 67 MMHG | TEMPERATURE: 97.8 F | OXYGEN SATURATION: 95 %

## 2020-05-20 PROCEDURE — 25000132 ZZH RX MED GY IP 250 OP 250 PS 637: Performed by: FAMILY MEDICINE

## 2020-05-20 RX ADMIN — IBUPROFEN 800 MG: 800 TABLET, FILM COATED ORAL at 07:59

## 2020-05-20 RX ADMIN — IBUPROFEN 800 MG: 800 TABLET, FILM COATED ORAL at 01:11

## 2020-05-20 RX ADMIN — ACETAMINOPHEN 975 MG: 325 TABLET, FILM COATED ORAL at 03:17

## 2020-05-20 RX ADMIN — IBUPROFEN 800 MG: 800 TABLET, FILM COATED ORAL at 13:55

## 2020-05-20 RX ADMIN — ACETAMINOPHEN 975 MG: 325 TABLET, FILM COATED ORAL at 08:49

## 2020-05-20 RX ADMIN — SENNOSIDES AND DOCUSATE SODIUM 2 TABLET: 8.6; 5 TABLET ORAL at 08:50

## 2020-05-20 NOTE — DISCHARGE SUMMARY
[facility] Discharge Summary    Amy Moore MRN# [id number]   Age: 35 year old YOB: 1985     Date of Admission:  2020  Date of Discharge::  2020  Admitting Physician:  Mello Nguyễn MD  Discharge Physician:  Mello Nguyễn MD, MD     Home clinic: [facility]          Admission Diagnoses:   Multigravida of advanced maternal age in third trimester [O09.523]  High-risk pregnancy in third trimester [O09.93]  S/P  section [Z98.891]          Discharge Diagnosis:   High-risk pregnancy in third trimester [O09.93]  Multigravida of advanced maternal age in third trimester [O09.523]          Procedures:   Procedure(s): Repeat low transverse  section       No other procedures performed during this admission           Medications Prior to Admission:     No medications prior to admission.             Discharge Medications:     Discharge Medication List as of 2020 10:21 AM      START taking these medications    Details   acetaminophen (TYLENOL) 325 MG tablet Take 3 tablets (975 mg) by mouth every 6 hours, No Print Out      ibuprofen (ADVIL/MOTRIN) 800 MG tablet Take 1 tablet (800 mg) by mouth every 6 hours, Disp-90 tablet,R-1, E-Prescribe      oxyCODONE (ROXICODONE) 5 MG tablet Take 1 tablet (5 mg) by mouth every 4 hours as needed for breakthrough pain, Disp-10 tablet,R-0, E-Prescribe      senna-docusate (SENOKOT-S/PERICOLACE) 8.6-50 MG tablet Take 1 tablet by mouth 2 times daily as needed for constipation, Disp-30 tablet,R-0, E-Prescribe         CONTINUE these medications which have NOT CHANGED    Details   omeprazole (PRILOSEC OTC) 20 MG EC tablet Take 1 tablet (20 mg) by mouth daily, Disp-60 tablet,R-1, E-Prescribe      Prenatal Vit-Fe Fumarate-FA (PRENATAL VITAMIN PO) Take 1 tablet by mouth daily, Historical                   Consultations:   Lactation consultation          Brief History of Labor or Admission:   This is a multiparous patient who had  unremarkable prenatal care with me at the [facility].  She did have some anxiety during the pregnancy especially related to the COVID-19 crisis.  She was scheduled for a repeat  on 2020 but when she came in for her preoperative evaluation on 2020 she was mary every 2 minutes and in early labor.  We admitted her to labor and delivery and did her  that afternoon.           Hospital Course:   The patient's hospital course was unremarkable.  She recovered as anticipated and experienced no post-operative complications.  On discharge, her pain was well controlled. Vaginal bleeding is similar to peak menstrual flow.  Voiding without difficulty.  Ambulating well and tolerating a normal diet.  No fever or significant wound drainage.  Breastfeeding well.  Infant is stable.  No bowel movement yet.  She was discharged on post-partum day #2.    Post-partum hemoglobin:   Hemoglobin   Date Value Ref Range Status   2020 12.3 11.7 - 15.7 g/dL Final             Discharge Instructions and Follow-Up:   Discharge diet: Regular   Discharge activity: Lifting restricted to 20 pounds for the first 2 wks, then increase by 10 pounds every week thereafter.  No tub soaks for >15 minutes the first 2 wks then as desired.  No driving for 2 week(s).  No sex for 6 week(s).    Discharge follow-up: Follow up with primary care provider in 6-8 weeks   Wound care: Drink plenty of fluids  Ice to area for comfort  Keep wound clean and dry           Discharge Disposition:   Discharged to home      Attestation:  I have reviewed today's vital signs, notes, medications, labs and imaging.  Amount of time performed on this discharge summary: 20 minutes.    Electronically signed by:  Mello Nguyễn M.D.  2020

## 2020-05-20 NOTE — PLAN OF CARE
S: Discharge  to home    B: Patient had a  delivery with no complications. Baby girl Baby's name Hellen, breast: . Support person's name Ketan.     A: VSS, Hgb 12.3, scant vag flow. Incision intact, glued, no drainage or redness. Mild incisional pain- using regular tylenol and ibuprofen. Will have oxycodone available after discharge. Breast feeding independently every 2 hours or less. Nipples intact breasts filling. No BM yet but passing gas.    R: All Discharge instructions reviewed and questions answered.  Belongings gathered and returned to the patient. Agreed to follow up in 6-8 weeks or sooner with any question or concerns.     Nursing Discharge Checklist:    Pneumovax screened and given, if appropriate: N/A  Influenza vaccine screened and given, if appropriate: N/A  Staples removed (): N/A  Breast milk returned: N/A  Hydrogel pads sent home:N/A  Birth Certificate Done: YES  Public Health Referral Made: N/A

## 2020-05-20 NOTE — DISCHARGE INSTRUCTIONS
[facility] Discharge Instructions     Discharge disposition:  Discharged to home       Diet:  Regular       Activity Lifting restricted to 20 pounds for the first 2 wks, then increase by 10 pounds every week thereafter.  No tub soaks for >15 minutes the first 2 wks then as desired.  No driving for 2 week(s).  No sex for 6 week(s).       Follow-up: Follow up with primary care provider in 6-8 weeks       Additional instructions: Wound care / dressings: may shower and keep wound clean and dry      Postop  Birth Instructions    Activity       Do not lift more than 10 pounds for 6 weeks after surgery.  Ask family and friends for help when you need it.    No driving until you have stopped taking your pain medications (usually two weeks after surgery).    No heavy exercise or activity for 6 weeks.  Don't do anything that will put a strain on your surgery site.    Don't strain when using the toilet.  Your care team may prescribe a stool softener if you have problems with your bowel movements.     To care for your incision:       Keep the incision clean and dry.    Do not soak your incision in water. No swimming or hot tubs until it has fully healed. You may soak in the bathtub if the water level is below your incision.    Do not use peroxide, gel, cream, lotion, or ointment on your incision.    Adjust your clothes to avoid pressure on your surgery site (check the elastic in your underwear for example).     You may see a small amount of clear or pink drainage and this is normal.  Check with your health care provider:       If the drainage increases or has an odor.    If the incision reddens, you have swelling, or develop a rash.    If you have increased pain and the medicine we prescribed doesn't help.    If you have a fever above 100.4 F (38 C) with or without chills when placing thermometer under your tongue.   The area around your incision (surgery wound), will feel numb.  This is normal.  The numbness should go away in less than a year.     Keep your hands clean:  Always wash your hands before touching your incision (surgery wound). This helps reduce your risk of infection. If your hands aren't dirty, you may use an alcohol hand-rub to clean your hands. Keep your nails clean and short.    Call your healthcare provider if you have any of these symptoms:       You soak a sanitary pad with blood within 1 hour, or you see blood clots larger than a golf ball.    Bleeding that lasts more than 6 weeks.    Vaginal discharge that smells bad.    Severe pain, cramping or tenderness in your lower belly area.    A need to urinate more frequently (use the toilet more often), more urgently (use the toilet very quickly), or it burns when you urinate.    Nausea and vomiting.    Redness, swelling or pain around a vein in your leg.    Problems breastfeeding or a red or painful area on your breast.    Chest pain and cough or are gasping for air.    Problems with coping with sadness, anxiety or depression. If you have concerns about hurting yourself or the baby, call your provider immediately.      You have questions or concerns after you return home.

## 2020-05-21 ENCOUNTER — MYC MEDICAL ADVICE (OUTPATIENT)
Dept: FAMILY MEDICINE | Facility: CLINIC | Age: 35
End: 2020-05-21

## 2020-05-31 ENCOUNTER — MYC MEDICAL ADVICE (OUTPATIENT)
Dept: FAMILY MEDICINE | Facility: CLINIC | Age: 35
End: 2020-05-31

## 2020-06-01 ENCOUNTER — MYC MEDICAL ADVICE (OUTPATIENT)
Dept: FAMILY MEDICINE | Facility: CLINIC | Age: 35
End: 2020-06-01

## 2020-06-05 ENCOUNTER — MYC MEDICAL ADVICE (OUTPATIENT)
Dept: FAMILY MEDICINE | Facility: CLINIC | Age: 35
End: 2020-06-05

## 2020-07-02 ENCOUNTER — PRENATAL OFFICE VISIT (OUTPATIENT)
Dept: FAMILY MEDICINE | Facility: CLINIC | Age: 35
End: 2020-07-02
Payer: COMMERCIAL

## 2020-07-02 VITALS
TEMPERATURE: 98.5 F | RESPIRATION RATE: 14 BRPM | WEIGHT: 212 LBS | DIASTOLIC BLOOD PRESSURE: 70 MMHG | BODY MASS INDEX: 33.4 KG/M2 | SYSTOLIC BLOOD PRESSURE: 104 MMHG | OXYGEN SATURATION: 98 % | HEART RATE: 88 BPM

## 2020-07-02 PROCEDURE — 99207 ZZC POST PARTUM EXAM: CPT | Performed by: FAMILY MEDICINE

## 2020-07-02 RX ORDER — ACETAMINOPHEN AND CODEINE PHOSPHATE 120; 12 MG/5ML; MG/5ML
0.35 SOLUTION ORAL DAILY
Qty: 100 TABLET | Refills: 1 | Status: SHIPPED | OUTPATIENT
Start: 2020-07-02 | End: 2021-05-13

## 2020-07-02 ASSESSMENT — PAIN SCALES - GENERAL: PAINLEVEL: NO PAIN (0)

## 2020-07-02 NOTE — PROGRESS NOTES
Subjective     Amy Moore is a 35 year old female who presents to clinic today for the following health issues:    HPI   Chief Complaint   Patient presents with     Postpartum Care     Patient states her postpartum course is going amazingly well she feels good she wants to get back to exercising.  She has no postpartum depression or anxiety.  He has been excellent sleeping 7 hours during the night which is also been wonderful.  They are looking to  weekend coming up.          Patient Active Problem List   Diagnosis     Slow transit constipation     Weight gain     Pelvic pain in female     S/P  section     LORI (generalized anxiety disorder)     Adjustment disorder with depressed mood     Internal hemorrhoids without complication     High-risk pregnancy     Maternal care due to low transverse uterine scar from previous  delivery     AMA (advanced maternal age) multigravida 35+     Carpal tunnel syndrome on right     Encounter for triage in pregnant patient     Multigravida of advanced maternal age in third trimester     High-risk pregnancy in third trimester     Past Surgical History:   Procedure Laterality Date      SECTION N/A 2016    Procedure:  SECTION;  Surgeon: Merrick Justice MD;  [facility]: [facility]      SECTION N/A 2020    Procedure:  SECTION;  Surgeon: Mello Nguyễn MD;  Location: [facility]       Social History     Tobacco Use     Smoking status: Former Smoker     Smokeless tobacco: Never Used     Tobacco comment: socially in early s   Substance Use Topics     Alcohol use: No     Alcohol/week: 0.0 standard drinks     Comment: socially     Family History   Problem Relation Age of Onset     Hypothyroidism Maternal Grandmother      Thyroid Disease Maternal Grandmother      No Known Problems Maternal Grandfather      No Known Problems Paternal Grandmother      Myocardial Infarction Paternal Grandfather 40     Hypothyroidism Mother       Hypertension Mother      Thyroid Disease Mother      No Known Problems Father      Hypothyroidism Sister      Thyroid Disease Sister      Hypothyroidism Sister      Thyroid Disease Sister      No Known Problems Son          Current Outpatient Medications   Medication Sig Dispense Refill     omeprazole (PRILOSEC OTC) 20 MG EC tablet Take 1 tablet (20 mg) by mouth daily 60 tablet 1     Prenatal Vit-Fe Fumarate-FA (PRENATAL VITAMIN PO) Take 1 tablet by mouth daily       acetaminophen (TYLENOL) 325 MG tablet Take 3 tablets (975 mg) by mouth every 6 hours       ibuprofen (ADVIL/MOTRIN) 800 MG tablet Take 1 tablet (800 mg) by mouth every 6 hours 90 tablet 1     oxyCODONE (ROXICODONE) 5 MG tablet Take 1 tablet (5 mg) by mouth every 4 hours as needed for breakthrough pain 10 tablet 0     senna-docusate (SENOKOT-S/PERICOLACE) 8.6-50 MG tablet Take 1 tablet by mouth 2 times daily as needed for constipation 30 tablet 0         Reviewed and updated as needed this visit by Provider         Review of Systems   Constitutional, HEENT, cardiovascular, pulmonary, gi and gu systems are negative, except as otherwise noted.      Objective    /70   Pulse 88   Temp 98.5  F (36.9  C) (Temporal)   Resp 14   Wt 96.2 kg (212 lb)   LMP  (LMP Unknown)   SpO2 98%   BMI 33.40 kg/m    Body mass index is 33.4 kg/m .  Physical Exam   GENERAL: healthy, alert and no distress  NECK: no adenopathy, no asymmetry, masses, or scars and thyroid normal to palpation  RESP: lungs clear to auscultation - no rales, rhonchi or wheezes  CV: regular rate and rhythm, normal S1 S2, no S3 or S4, no murmur, click or rub, no peripheral edema and peripheral pulses strong  ABDOMEN: soft, nontender, without hepatosplenomegaly or masses, bowel sounds normal and well-healed incision   MS: no gross musculoskeletal defects noted, no edema            Assessment & Plan   Assessment      Plan  1. Routine postpartum follow-up    Being amazingly well she can get back  to exercise they can get back to normal intimacy.  No concerns whatsoever.  Will use the Nor-QD birth control pill for her    Merrick Justice MD

## 2020-12-20 ENCOUNTER — HEALTH MAINTENANCE LETTER (OUTPATIENT)
Age: 35
End: 2020-12-20

## 2021-03-18 DIAGNOSIS — F41.8 SITUATIONAL ANXIETY: Primary | ICD-10-CM

## 2021-03-18 RX ORDER — FLUOXETINE 10 MG/1
CAPSULE ORAL
Qty: 60 CAPSULE | Refills: 3 | Status: SHIPPED | OUTPATIENT
Start: 2021-03-18 | End: 2021-05-13

## 2021-03-18 NOTE — TELEPHONE ENCOUNTER
Patient is having significantly worsening anxiety and just is not feeling her normal self.  Is gotten worse since Covid has started.  She has excessive worry at work when she is away from her kids and  and starts thinking about potentially bad things that could happen to them when she is not around.  She is agreeable to start fluoxetine 10 mg at at bedtime and then will increase the dose in 2 weeks if needed.  She has an appointment with me in May so we will do a follow-up at that time to see how things are working for her.    Electronically signed by:  Mello Nguyễn M.D.  3/18/2021
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
## 2021-05-12 ASSESSMENT — ENCOUNTER SYMPTOMS
ARTHRALGIAS: 1
COUGH: 0
DIARRHEA: 0
NAUSEA: 0
HEMATOCHEZIA: 0
JOINT SWELLING: 0
NERVOUS/ANXIOUS: 1
HEMATURIA: 0
MYALGIAS: 1
ABDOMINAL PAIN: 1
HEARTBURN: 0
BREAST MASS: 0
EYE PAIN: 1
PALPITATIONS: 0
FEVER: 0
CHILLS: 0
SORE THROAT: 0
DYSURIA: 0
CONSTIPATION: 0
PARESTHESIAS: 0
FREQUENCY: 0
WEAKNESS: 0
HEADACHES: 1
SHORTNESS OF BREATH: 0
DIZZINESS: 0

## 2021-05-13 ENCOUNTER — OFFICE VISIT (OUTPATIENT)
Dept: FAMILY MEDICINE | Facility: CLINIC | Age: 36
End: 2021-05-13
Payer: COMMERCIAL

## 2021-05-13 VITALS
TEMPERATURE: 98.2 F | OXYGEN SATURATION: 98 % | WEIGHT: 212 LBS | HEART RATE: 78 BPM | RESPIRATION RATE: 18 BRPM | BODY MASS INDEX: 33.4 KG/M2 | DIASTOLIC BLOOD PRESSURE: 76 MMHG | SYSTOLIC BLOOD PRESSURE: 120 MMHG

## 2021-05-13 DIAGNOSIS — F41.1 GAD (GENERALIZED ANXIETY DISORDER): ICD-10-CM

## 2021-05-13 DIAGNOSIS — Z00.00 ROUTINE GENERAL MEDICAL EXAMINATION AT A HEALTH CARE FACILITY: Primary | ICD-10-CM

## 2021-05-13 DIAGNOSIS — Z11.59 ENCOUNTER FOR HEPATITIS C SCREENING TEST FOR LOW RISK PATIENT: ICD-10-CM

## 2021-05-13 DIAGNOSIS — K64.4 EXTERNAL HEMORRHOIDS: ICD-10-CM

## 2021-05-13 DIAGNOSIS — E66.09 CLASS 1 OBESITY DUE TO EXCESS CALORIES WITHOUT SERIOUS COMORBIDITY WITH BODY MASS INDEX (BMI) OF 33.0 TO 33.9 IN ADULT: ICD-10-CM

## 2021-05-13 DIAGNOSIS — E66.811 CLASS 1 OBESITY DUE TO EXCESS CALORIES WITHOUT SERIOUS COMORBIDITY WITH BODY MASS INDEX (BMI) OF 33.0 TO 33.9 IN ADULT: ICD-10-CM

## 2021-05-13 LAB
HGB BLD-MCNC: 14.4 G/DL (ref 11.7–15.7)
TSH SERPL DL<=0.005 MIU/L-ACNC: 2.59 MU/L (ref 0.4–4)

## 2021-05-13 PROCEDURE — 99214 OFFICE O/P EST MOD 30 MIN: CPT | Mod: 25 | Performed by: FAMILY MEDICINE

## 2021-05-13 PROCEDURE — 99395 PREV VISIT EST AGE 18-39: CPT | Performed by: FAMILY MEDICINE

## 2021-05-13 PROCEDURE — 86803 HEPATITIS C AB TEST: CPT | Performed by: FAMILY MEDICINE

## 2021-05-13 PROCEDURE — 84443 ASSAY THYROID STIM HORMONE: CPT | Performed by: FAMILY MEDICINE

## 2021-05-13 PROCEDURE — 36415 COLL VENOUS BLD VENIPUNCTURE: CPT | Performed by: FAMILY MEDICINE

## 2021-05-13 PROCEDURE — 85018 HEMOGLOBIN: CPT | Performed by: FAMILY MEDICINE

## 2021-05-13 RX ORDER — FLUOXETINE 10 MG/1
CAPSULE ORAL
Qty: 60 CAPSULE | Refills: 3 | Status: SHIPPED | OUTPATIENT
Start: 2021-05-13 | End: 2021-07-26

## 2021-05-13 SDOH — HEALTH STABILITY: PHYSICAL HEALTH: ON AVERAGE, HOW MANY DAYS PER WEEK DO YOU ENGAGE IN MODERATE TO STRENUOUS EXERCISE (LIKE A BRISK WALK)?: 3 DAYS

## 2021-05-13 SDOH — HEALTH STABILITY: MENTAL HEALTH: HOW OFTEN DO YOU HAVE A DRINK CONTAINING ALCOHOL?: 2-3 TIMES A WEEK

## 2021-05-13 SDOH — HEALTH STABILITY: MENTAL HEALTH: HOW OFTEN DO YOU HAVE 6 OR MORE DRINKS ON ONE OCCASION?: NEVER

## 2021-05-13 SDOH — HEALTH STABILITY: MENTAL HEALTH: HOW MANY STANDARD DRINKS CONTAINING ALCOHOL DO YOU HAVE ON A TYPICAL DAY?: 1 OR 2

## 2021-05-13 ASSESSMENT — ENCOUNTER SYMPTOMS
ABDOMINAL PAIN: 1
WEAKNESS: 0
JOINT SWELLING: 0
EYE PAIN: 1
PARESTHESIAS: 0
DYSURIA: 0
HEMATOCHEZIA: 0
COUGH: 0
DIZZINESS: 0
NAUSEA: 0
CHILLS: 0
SORE THROAT: 0
PALPITATIONS: 0
NERVOUS/ANXIOUS: 1
HEARTBURN: 0
HEADACHES: 1
SHORTNESS OF BREATH: 0
BREAST MASS: 0
ARTHRALGIAS: 1
CONSTIPATION: 0
FREQUENCY: 0
MYALGIAS: 1
DIARRHEA: 0
FEVER: 0
HEMATURIA: 0

## 2021-05-13 ASSESSMENT — ANXIETY QUESTIONNAIRES
5. BEING SO RESTLESS THAT IT IS HARD TO SIT STILL: NOT AT ALL
6. BECOMING EASILY ANNOYED OR IRRITABLE: NEARLY EVERY DAY
IF YOU CHECKED OFF ANY PROBLEMS ON THIS QUESTIONNAIRE, HOW DIFFICULT HAVE THESE PROBLEMS MADE IT FOR YOU TO DO YOUR WORK, TAKE CARE OF THINGS AT HOME, OR GET ALONG WITH OTHER PEOPLE: SOMEWHAT DIFFICULT
1. FEELING NERVOUS, ANXIOUS, OR ON EDGE: NEARLY EVERY DAY
7. FEELING AFRAID AS IF SOMETHING AWFUL MIGHT HAPPEN: MORE THAN HALF THE DAYS
3. WORRYING TOO MUCH ABOUT DIFFERENT THINGS: MORE THAN HALF THE DAYS
2. NOT BEING ABLE TO STOP OR CONTROL WORRYING: SEVERAL DAYS
GAD7 TOTAL SCORE: 12

## 2021-05-13 ASSESSMENT — PAIN SCALES - GENERAL: PAINLEVEL: NO PAIN (0)

## 2021-05-13 ASSESSMENT — PATIENT HEALTH QUESTIONNAIRE - PHQ9: 5. POOR APPETITE OR OVEREATING: SEVERAL DAYS
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
## 2021-05-14 PROBLEM — O09.523 MULTIGRAVIDA OF ADVANCED MATERNAL AGE IN THIRD TRIMESTER: Status: RESOLVED | Noted: 2020-04-13 | Resolved: 2021-05-14

## 2021-05-14 PROBLEM — F43.21 ADJUSTMENT DISORDER WITH DEPRESSED MOOD: Status: RESOLVED | Noted: 2017-09-07 | Resolved: 2021-05-14

## 2021-05-14 PROBLEM — E66.09 CLASS 1 OBESITY DUE TO EXCESS CALORIES WITHOUT SERIOUS COMORBIDITY WITH BODY MASS INDEX (BMI) OF 33.0 TO 33.9 IN ADULT: Status: ACTIVE | Noted: 2021-05-14

## 2021-05-14 PROBLEM — E66.811 CLASS 1 OBESITY DUE TO EXCESS CALORIES WITHOUT SERIOUS COMORBIDITY WITH BODY MASS INDEX (BMI) OF 33.0 TO 33.9 IN ADULT: Status: ACTIVE | Noted: 2021-05-14

## 2021-05-14 PROBLEM — O09.93 HIGH-RISK PREGNANCY IN THIRD TRIMESTER: Status: RESOLVED | Noted: 2020-04-13 | Resolved: 2021-05-14

## 2021-05-14 PROBLEM — O09.529 AMA (ADVANCED MATERNAL AGE) MULTIGRAVIDA 35+: Status: RESOLVED | Noted: 2019-10-30 | Resolved: 2021-05-14

## 2021-05-14 PROBLEM — O34.211 MATERNAL CARE DUE TO LOW TRANSVERSE UTERINE SCAR FROM PREVIOUS CESAREAN DELIVERY: Status: RESOLVED | Noted: 2019-10-30 | Resolved: 2021-05-14

## 2021-05-14 PROBLEM — Z36.89 ENCOUNTER FOR TRIAGE IN PREGNANT PATIENT: Status: RESOLVED | Noted: 2020-02-09 | Resolved: 2021-05-14

## 2021-05-14 LAB — HCV AB SERPL QL IA: NONREACTIVE

## 2021-05-14 ASSESSMENT — ANXIETY QUESTIONNAIRES: GAD7 TOTAL SCORE: 12

## 2021-05-17 ENCOUNTER — TELEPHONE (OUTPATIENT)
Dept: BEHAVIORAL HEALTH | Facility: CLINIC | Age: 36
End: 2021-05-17

## 2021-05-17 DIAGNOSIS — F41.1 GAD (GENERALIZED ANXIETY DISORDER): Primary | ICD-10-CM

## 2021-05-17 NOTE — TELEPHONE ENCOUNTER
Phone Encounter   [facility] made attempt to reach patient, by PCP request. Patient's VM was full and this writer was unable to reach the patient. A [employer] message will be sent.    JAYDEN Day, Behavioral Health Clinician

## 2021-07-22 ENCOUNTER — MYC MEDICAL ADVICE (OUTPATIENT)
Dept: FAMILY MEDICINE | Facility: CLINIC | Age: 36
End: 2021-07-22

## 2021-10-03 ENCOUNTER — HEALTH MAINTENANCE LETTER (OUTPATIENT)
Age: 36
End: 2021-10-03

## 2021-10-28 ENCOUNTER — E-VISIT (OUTPATIENT)
Dept: FAMILY MEDICINE | Facility: CLINIC | Age: 36
End: 2021-10-28
Payer: COMMERCIAL

## 2021-10-28 ENCOUNTER — MYC MEDICAL ADVICE (OUTPATIENT)
Dept: FAMILY MEDICINE | Facility: CLINIC | Age: 36
End: 2021-10-28

## 2021-10-28 DIAGNOSIS — H10.32 ACUTE CONJUNCTIVITIS OF LEFT EYE, UNSPECIFIED ACUTE CONJUNCTIVITIS TYPE: Primary | ICD-10-CM

## 2021-10-28 PROCEDURE — 99422 OL DIG E/M SVC 11-20 MIN: CPT | Performed by: FAMILY MEDICINE

## 2021-10-28 RX ORDER — POLYMYXIN B SULFATE AND TRIMETHOPRIM 1; 10000 MG/ML; [USP'U]/ML
1-2 SOLUTION OPHTHALMIC 4 TIMES DAILY
Qty: 4 ML | Refills: 0 | Status: SHIPPED | OUTPATIENT
Start: 2021-10-28 | End: 2021-11-07

## 2022-07-10 ENCOUNTER — HEALTH MAINTENANCE LETTER (OUTPATIENT)
Age: 37
End: 2022-07-10

## 2022-09-10 ENCOUNTER — HEALTH MAINTENANCE LETTER (OUTPATIENT)
Age: 37
End: 2022-09-10

## 2022-10-05 ASSESSMENT — ENCOUNTER SYMPTOMS
BREAST MASS: 0
WEAKNESS: 0
HEARTBURN: 0
JOINT SWELLING: 0
DIZZINESS: 0
ABDOMINAL PAIN: 0
EYE PAIN: 0
NAUSEA: 0
FEVER: 0
NERVOUS/ANXIOUS: 1
HEMATOCHEZIA: 0
PARESTHESIAS: 0
HEADACHES: 0
SHORTNESS OF BREATH: 0
CHILLS: 0
HEMATURIA: 0
DYSURIA: 0
SORE THROAT: 0
COUGH: 0
PALPITATIONS: 0
CONSTIPATION: 0
FREQUENCY: 0
MYALGIAS: 0
ARTHRALGIAS: 0
DIARRHEA: 0

## 2022-10-06 ENCOUNTER — OFFICE VISIT (OUTPATIENT)
Dept: FAMILY MEDICINE | Facility: CLINIC | Age: 37
End: 2022-10-06
Payer: COMMERCIAL

## 2022-10-06 VITALS
HEART RATE: 75 BPM | SYSTOLIC BLOOD PRESSURE: 129 MMHG | OXYGEN SATURATION: 98 % | DIASTOLIC BLOOD PRESSURE: 76 MMHG | RESPIRATION RATE: 19 BRPM | WEIGHT: 210.2 LBS | HEIGHT: 67 IN | BODY MASS INDEX: 32.99 KG/M2

## 2022-10-06 DIAGNOSIS — K64.9 BLEEDING HEMORRHOIDS: ICD-10-CM

## 2022-10-06 DIAGNOSIS — Z00.01 ENCOUNTER FOR ROUTINE ADULT MEDICAL EXAM WITH ABNORMAL FINDINGS: Primary | ICD-10-CM

## 2022-10-06 DIAGNOSIS — N92.0 MENORRHAGIA WITH REGULAR CYCLE: ICD-10-CM

## 2022-10-06 DIAGNOSIS — M54.50 CHRONIC MIDLINE LOW BACK PAIN WITHOUT SCIATICA: ICD-10-CM

## 2022-10-06 DIAGNOSIS — G89.29 CHRONIC MIDLINE LOW BACK PAIN WITHOUT SCIATICA: ICD-10-CM

## 2022-10-06 DIAGNOSIS — R10.2 PELVIC PAIN IN FEMALE: ICD-10-CM

## 2022-10-06 DIAGNOSIS — F41.1 GAD (GENERALIZED ANXIETY DISORDER): ICD-10-CM

## 2022-10-06 LAB
ANION GAP SERPL CALCULATED.3IONS-SCNC: 6 MMOL/L (ref 3–14)
BUN SERPL-MCNC: 16 MG/DL (ref 7–30)
CALCIUM SERPL-MCNC: 8.9 MG/DL (ref 8.5–10.1)
CHLORIDE BLD-SCNC: 109 MMOL/L (ref 94–109)
CO2 SERPL-SCNC: 25 MMOL/L (ref 20–32)
CREAT SERPL-MCNC: 0.77 MG/DL (ref 0.52–1.04)
GFR SERPL CREATININE-BSD FRML MDRD: >90 ML/MIN/1.73M2
GLUCOSE BLD-MCNC: 99 MG/DL (ref 70–99)
HGB BLD-MCNC: 14 G/DL (ref 11.7–15.7)
POTASSIUM BLD-SCNC: 3.9 MMOL/L (ref 3.4–5.3)
SODIUM SERPL-SCNC: 140 MMOL/L (ref 133–144)
TSH SERPL DL<=0.005 MIU/L-ACNC: 2.7 MU/L (ref 0.4–4)

## 2022-10-06 PROCEDURE — 85018 HEMOGLOBIN: CPT | Performed by: FAMILY MEDICINE

## 2022-10-06 PROCEDURE — 80048 BASIC METABOLIC PNL TOTAL CA: CPT | Performed by: FAMILY MEDICINE

## 2022-10-06 PROCEDURE — 36415 COLL VENOUS BLD VENIPUNCTURE: CPT | Performed by: FAMILY MEDICINE

## 2022-10-06 PROCEDURE — 84443 ASSAY THYROID STIM HORMONE: CPT | Performed by: FAMILY MEDICINE

## 2022-10-06 PROCEDURE — 99214 OFFICE O/P EST MOD 30 MIN: CPT | Mod: 25 | Performed by: FAMILY MEDICINE

## 2022-10-06 PROCEDURE — 99395 PREV VISIT EST AGE 18-39: CPT | Performed by: FAMILY MEDICINE

## 2022-10-06 SDOH — HEALTH STABILITY: PHYSICAL HEALTH: ON AVERAGE, HOW MANY MINUTES DO YOU ENGAGE IN EXERCISE AT THIS LEVEL?: 0 MIN

## 2022-10-06 SDOH — ECONOMIC STABILITY: FOOD INSECURITY: WITHIN THE PAST 12 MONTHS, THE FOOD YOU BOUGHT JUST DIDN'T LAST AND YOU DIDN'T HAVE MONEY TO GET MORE.: NEVER TRUE

## 2022-10-06 SDOH — ECONOMIC STABILITY: FOOD INSECURITY: WITHIN THE PAST 12 MONTHS, YOU WORRIED THAT YOUR FOOD WOULD RUN OUT BEFORE YOU GOT MONEY TO BUY MORE.: NEVER TRUE

## 2022-10-06 SDOH — HEALTH STABILITY: PHYSICAL HEALTH: ON AVERAGE, HOW MANY DAYS PER WEEK DO YOU ENGAGE IN MODERATE TO STRENUOUS EXERCISE (LIKE A BRISK WALK)?: 0 DAYS

## 2022-10-06 ASSESSMENT — ENCOUNTER SYMPTOMS
DIARRHEA: 0
CHILLS: 0
ABDOMINAL PAIN: 0
BREAST MASS: 0
COUGH: 0
HEADACHES: 0
HEMATOCHEZIA: 0
MYALGIAS: 0
SORE THROAT: 0
FREQUENCY: 0
PALPITATIONS: 0
HEMATURIA: 0
NAUSEA: 0
CONSTIPATION: 0
NERVOUS/ANXIOUS: 1
ARTHRALGIAS: 0
SHORTNESS OF BREATH: 0
EYE PAIN: 0
DYSURIA: 0
PARESTHESIAS: 0
WEAKNESS: 0
JOINT SWELLING: 0
DIZZINESS: 0
HEARTBURN: 0
FEVER: 0

## 2022-10-06 ASSESSMENT — SOCIAL DETERMINANTS OF HEALTH (SDOH)
HOW HARD IS IT FOR YOU TO PAY FOR THE VERY BASICS LIKE FOOD, HOUSING, MEDICAL CARE, AND HEATING?: NOT HARD AT ALL
WITHIN THE LAST YEAR, HAVE YOU BEEN HUMILIATED OR EMOTIONALLY ABUSED IN OTHER WAYS BY YOUR PARTNER OR EX-PARTNER?: NO
HOW OFTEN DO YOU ATTENT MEETINGS OF THE CLUB OR ORGANIZATION YOU BELONG TO?: NEVER
HOW OFTEN DO YOU GET TOGETHER WITH FRIENDS OR RELATIVES?: ONCE A WEEK
WITHIN THE LAST YEAR, HAVE YOU BEEN AFRAID OF YOUR PARTNER OR EX-PARTNER?: NO
IN A TYPICAL WEEK, HOW MANY TIMES DO YOU TALK ON THE PHONE WITH FAMILY, FRIENDS, OR NEIGHBORS?: MORE THAN THREE TIMES A WEEK
WITHIN THE LAST YEAR, HAVE YOU BEEN KICKED, HIT, SLAPPED, OR OTHERWISE PHYSICALLY HURT BY YOUR PARTNER OR EX-PARTNER?: NO
HOW OFTEN DO YOU ATTEND CHURCH OR RELIGIOUS SERVICES?: MORE THAN 4 TIMES PER YEAR
WITHIN THE LAST YEAR, HAVE TO BEEN RAPED OR FORCED TO HAVE ANY KIND OF SEXUAL ACTIVITY BY YOUR PARTNER OR EX-PARTNER?: NO
DO YOU BELONG TO ANY CLUBS OR ORGANIZATIONS SUCH AS CHURCH GROUPS UNIONS, FRATERNAL OR ATHLETIC GROUPS, OR SCHOOL GROUPS?: NO
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
## 2022-10-06 NOTE — COMMUNITY RESOURCES LIST (ENGLISH)
10/06/2022   [facility]  N/A  For questions about this resource list or additional care needs, please contact your primary care clinic or care manager.  Phone: [phone]   Email: N/A   Address: [address] [place] [address]   Hours: N/A        Exercise and Recreation       Gym or workout facility  1  [facility] Distance: 15.88 miles      COVID-19 Status: Regular Operations   [address] [place] [address]  Language: English  Hours: Mon - Sun Open 24 Hours  Fees: Self Pay   Phone: (597) [phone] Website: [email or web address]     2  [facility] Distance: 22.32 miles      COVID-19 Status: Regular Operations   [address] [place] [address]  Language: English  Hours: Mon - Sun Open 24 Hours  Fees: Self Pay   Phone: (328) [phone] Website: [email or web address]          Mental Health       Individual counseling  3  [facility] Distance: 5.65 miles      COVID-19 Status: Regular Operations, COVID-19 Status: Phone/Virtual   [address] [place] [address]  Language: English  Hours: Mon - Fri 8:00 AM - 5:00 PM  Fees: Insurance, Self Pay   Phone: [phone] Email: [email or web address] Website: [email or web address]     4  [facility] Distance: 12.99 miles      COVID-19 Status: Regular Operations, COVID-19 Status: Phone/Virtual   [address] [place] [address]  Language: English  Hours: Mon - Fri 8:00 AM - 5:00 PM  Fees: Insurance, Self Pay   Phone: (586) [phone] Email: inquiries@[email or web address] Website: [email or web address]/          Important Numbers & Websites       Emergency Services   911  [facility] Services   311  Poison Control   (497) 286-5909  Suicide Prevention Lifeline   (282) 536-2596 (TALK)  Child Abuse Hotline   (305) 442-5867 (4-A-Child)  Sexual Assault Hotline    (914) 328-5323 (HOPE)  National Runaway Safeline   (155) 278-6234 (RUNAWAY)  All-Options Talkline   (618) 333-7627  Substance Abuse Referral   (271) 437-5733 (HELP)     Yes

## 2022-10-06 NOTE — COMMUNITY RESOURCES LIST (ENGLISH)
10/06/2022   [facility] - Outpatient Clinics  N/A  For questions about this resource list or additional care needs, please contact your primary care clinic or care manager.  Phone: [phone]   Email: N/A   Address: [address] [place] [address]   Hours: N/A        Mental Health       Individual counseling  1  [facility] Distance: 5.65 miles      COVID-19 Status: Regular Operations, COVID-19 Status: Phone/Virtual   [address] [place] [address]  Language: English  Hours: Mon - Fri 8:00 AM - 5:00 PM  Fees: Insurance, Self Pay   Phone: ([phone]) [phone] Email: [email or web address] Website: [email or web address]     2  [facility] Distance: 12.99 miles      COVID-19 Status: Regular Operations, COVID-19 Status: Phone/Virtual   [address] [place] [address]  Language: English  Hours: Mon - Fri 8:00 AM - 5:00 PM  Fees: Insurance, Self Pay   Phone: ([phone] [phone] Email: [email or web address] Website: [email or web address]/          Important Numbers & Websites       Emergency Services   911  City Services   311  Poison Control   (146) 262-8989  Suicide Prevention Lifeline   (676) 276-4422 (TALK)  Child Abuse Hotline   (941) 809-1893 (4-A-Child)  Sexual Assault Hotline   (561) 433-8786 (HOPE)  National Runaway Safeline   (568) 675-6723 (RUNAWAY)  All-Options Talkline   (454) 618-4269  Substance Abuse Referral   (355) 504-8636 (HELP)

## 2022-10-06 NOTE — COMMUNITY RESOURCES LIST (ENGLISH)
10/06/2022   [facility] - Outpatient Clinics  N/A  For questions about this resource list or additional care needs, please contact your primary care clinic or care manager.  Phone: [phone]   Email: N/A   Address: [address] [place] [address]   Hours: N/A        Mental Health       Individual counseling  1  [facility] Distance: 5.65 miles      COVID-19 Status: Regular Operations, COVID-19 Status: Phone/Virtual   [address] [place] [address]  Language: English  Hours: Mon - Fri 8:00 AM - 5:00 PM  Fees: Insurance, Self Pay   Phone: ([phone]) [phone] Email: [email or web address] Website: [email or web address]     2  [facility] Distance: 12.99 miles      COVID-19 Status: Regular Operations, COVID-19 Status: Phone/Virtual   [address] [place] [address]  Language: English  Hours: Mon - Fri 8:00 AM - 5:00 PM  Fees: Insurance, Self Pay   Phone: ([phone] [phone] Email: [email or web address] Website: [email or web address]/          Important Numbers & Websites       Emergency Services   911  City Services   311  Poison Control   (499) 508-9079  Suicide Prevention Lifeline   (150) 925-4485 (TALK)  Child Abuse Hotline   (442) 342-2097 (4-A-Child)  Sexual Assault Hotline   (937) 674-6955 (HOPE)  National Runaway Safeline   (356) 440-8078 (RUNAWAY)  All-Options Talkline   (201) 245-6892  Substance Abuse Referral   (505) 329-7837 (HELP)

## 2022-10-06 NOTE — COMMUNITY RESOURCES LIST (ENGLISH)
10/06/2022   [facility]  N/A  For questions about this resource list or additional care needs, please contact your primary care clinic or care manager.  Phone: [phone]   Email: N/A   Address: [address] [place] [address]   Hours: N/A        Exercise and Recreation       Gym or workout facility  1  [facility] Distance: 15.88 miles      COVID-19 Status: Regular Operations   [address] [place] [address]  Language: English  Hours: Mon - Sun Open 24 Hours  Fees: Self Pay   Phone: (823) 733-7632 Website: [email or web address]     2  [facility] Distance: 22.32 miles      COVID-19 Status: Regular Operations   [address] [place] [address]  Language: English  Hours: Mon - Sun Open 24 Hours  Fees: Self Pay   Phone: (327) 428-8650 Website: [email or web address]          Mental Health       Individual counseling  3  [facility] Family [facility] Distance: 5.65 miles      COVID-19 Status: Regular Operations, COVID-19 Status: Phone/Virtual   [address] [place] [address]  Language: English  Hours: Mon - Fri 8:00 AM - 5:00 PM  Fees: Insurance, Self Pay   Phone: ([phone] [phone] Email: [email or web address] Website: [email or web address]     4  [facility] Distance: 12.99 miles      COVID-19 Status: Regular Operations, COVID-19 Status: Phone/Virtual   [address] [place] [address]  Language: English  Hours: Mon - Fri 8:00 AM - 5:00 PM  Fees: Insurance, Self Pay   Phone: (466) 304-5491 Email: inquiries@[email or web address] Website: [email or web address]/          Important Numbers & Websites       Emergency Services   911  [facility] Services   311  Poison Control   (506) 239-8751  Suicide Prevention Lifeline   (692) 283-1056 (TALK)  Child Abuse Hotline   (592) 402-4739 (4-A-Child)  Sexual Assault Hotline    (147) 982-5942 (HOPE)  National Runaway Safeline   (351) 260-6752 (RUNAWAY)  All-Options Talkline   (684) 871-7432  Substance Abuse Referral   (974) 890-7884 (HELP)

## 2022-10-06 NOTE — COMMUNITY RESOURCES LIST (ENGLISH)
10/06/2022   [facility] - Outpatient Clinics  N/A  For questions about this resource list or additional care needs, please contact your primary care clinic or care manager.  Phone: [phone]   Email: N/A   Address: [address] [place] [address]   Hours: N/A        Mental Health       Individual counseling  1  [facility] Distance: 5.65 miles      COVID-19 Status: Regular Operations, COVID-19 Status: Phone/Virtual   [address] [place] [address]  Language: English  Hours: Mon - Fri 8:00 AM - 5:00 PM  Fees: Insurance, Self Pay   Phone: (189) 310-5289 Email: [email or web address] Website: [email or web address]     2  [facility] Distance: 12.99 miles      COVID-19 Status: Regular Operations, COVID-19 Status: Phone/Virtual   [address] [place] [address]  Language: English  Hours: Mon - Fri 8:00 AM - 5:00 PM  Fees: Insurance, Self Pay   Phone: (740) 773-6473 Email: [email or web address] Website: [email or web address]          Important Numbers & Websites       Emergency Services   911  City Services   311  Poison Control   (488) 949-7890  Suicide Prevention Lifeline   (648) 460-7487 (TALK)  Child Abuse Hotline   (722) 454-7148 (4-A-Child)  Sexual Assault Hotline   (663) 542-3270 (HOPE)  National Runaway Safeline   (960) 113-5091 (RUNAWAY)  All-Options Talkline   (935) 828-1816  Substance Abuse Referral   (749) 931-6074 (HELP)

## 2022-10-07 ENCOUNTER — TELEPHONE (OUTPATIENT)
Dept: BEHAVIORAL HEALTH | Facility: CLINIC | Age: 37
End: 2022-10-07

## 2022-10-07 ENCOUNTER — HOSPITAL ENCOUNTER (OUTPATIENT)
Dept: ULTRASOUND IMAGING | Facility: CLINIC | Age: 37
Discharge: HOME | End: 2022-10-07
Attending: FAMILY MEDICINE | Admitting: FAMILY MEDICINE
Payer: COMMERCIAL

## 2022-10-07 DIAGNOSIS — R10.2 PELVIC PAIN IN FEMALE: ICD-10-CM

## 2022-10-07 PROCEDURE — 76830 TRANSVAGINAL US NON-OB: CPT

## 2022-10-07 NOTE — TELEPHONE ENCOUNTER
Phone Encounter   [facility] spoke with patient, by PCP request. Introduced self and role to patient. Patient is scheduled with this writer for an initial visit on 10/25/2022.    JAYDEN Day, Behavioral Health Clinician

## 2022-10-10 DIAGNOSIS — N83.201 HEMORRHAGIC CYST OF RIGHT OVARY: Primary | ICD-10-CM

## 2022-10-13 ENCOUNTER — HOSPITAL ENCOUNTER (OUTPATIENT)
Dept: PHYSICAL THERAPY | Facility: CLINIC | Age: 37
Setting detail: THERAPIES SERIES
Discharge: HOME | End: 2022-10-13
Attending: FAMILY MEDICINE
Payer: COMMERCIAL

## 2022-10-13 DIAGNOSIS — G89.29 CHRONIC MIDLINE LOW BACK PAIN WITHOUT SCIATICA: ICD-10-CM

## 2022-10-13 DIAGNOSIS — M54.50 CHRONIC MIDLINE LOW BACK PAIN WITHOUT SCIATICA: ICD-10-CM

## 2022-10-13 PROCEDURE — 97161 PT EVAL LOW COMPLEX 20 MIN: CPT | Mod: GP | Performed by: PHYSICAL THERAPIST

## 2022-10-13 PROCEDURE — 97140 MANUAL THERAPY 1/> REGIONS: CPT | Mod: GP | Performed by: PHYSICAL THERAPIST

## 2022-10-13 PROCEDURE — 97110 THERAPEUTIC EXERCISES: CPT | Mod: GP | Performed by: PHYSICAL THERAPIST

## 2022-10-13 NOTE — PROGRESS NOTES
10/13/22 1500   General Information   Type of Visit Initial OP Ortho PT Evaluation   Start of Care Date 10/13/22   Referring Physician Dr. Nguyễn   Patient/Family Goals Statement Reduce pain   Orders Evaluate and Treat   Date of Order 10/06/22   Certification Required? No   Medical Diagnosis BCBS out of [place]   Surgical/Medical history reviewed Yes   Weight-Bearing Status - LUE full weight-bearing   Weight-Bearing Status - RUE full weight-bearing   Weight-Bearing Status - LLE full weight-bearing   Weight-Bearing Status - RLE full weight-bearing   Body Part(s)   Body Part(s) Lumbar Spine/SI   Presentation and Etiology   Pertinent history of current problem (include personal factors and/or comorbidities that impact the POC) Pt reports low back pain has been presented for about 6 months.  Pt reports that it occurs when getting up in the morning or going sit to stand.  Pt reports that motions such as bending forward.  Pt reports stretching seems to help.  Pt reports having a snowboarding accident over 15 years ago.  Pt reports having pain when initiating a weightlifting program about a year ago.  PMH: high risk pregnancy , slow transit constipation,   and 2016, anxiety.   Impairments A. Pain;D. Decreased ROM;E. Decreased flexibility;F. Decreased strength and endurance   Functional Limitations perform desired leisure / sports activities;perform required work activities;perform activities of daily living   Symptom Location Low back near lumbar spine and paraspinals   How/Where did it occur From insidious onset   Onset date of current episode/exacerbation 22   Chronicity Chronic   Pain rating (0-10 point scale) Best (/10);Worst (/10)   Best (/10) 1/10   Worst (/10) 9/10   Pain quality C. Aching   Frequency of pain/symptoms B. Intermittent   Pain/symptoms are: Worse in the morning   Pain/symptoms exacerbated by G. Certain positions;E. Rest;K. Home tasks;L. Work tasks   Pain/symptoms eased by E.  Changing positions;F. Certain positions;B. Walking   Progression of symptoms since onset: Worsened   Current Level of Function   Current Community Support Family/friend caregiver   Patient role/employment history A. Employed   Employment Comments [employer]   [facility]   Home/community accessibility no concerns   Fall Risk Screen   Fall screen completed by PT   Have you fallen 2 or more times in the past year? No   Have you fallen and had an injury in the past year? No   Abuse Screen (yes response referral indicated)   Feels Unsafe at Home or Work/School no   Feels Threatened by Someone no   Does Anyone Try to Keep You From Having Contact with Others or Doing Things Outside Your Home? no   Physical Signs of Abuse Present no   Patient needs abuse support services and resources No   Lumbar Spine/SI Objective Findings   Flexion %   Extension ROM 50%   Right Side Bending ROM 40%   Left Side Bending ROM 40%   Repeated Extension-Standing ROM increased pain   Repeated Flexion-Standing ROM no change   Hip Flexion (L2) Strength 5/5   Knee Flexion Strength 5/5   Knee Extension (L3) Strength 5/5   Ankle Dorsiflexion (L4) Strength 5/5   Great Toe Extension (L5) Strength 5/5   Ankle Plantar Flexion (S1) Strength 5/5   SLR 85 degrees B   Crossover SLR negative   Palpation Tender to palpation at B lumbar paraspinals.   Planned Therapy Interventions   Planned Therapy Interventions joint mobilization;manual therapy;neuromuscular re-education;ROM;strengthening;stretching   Planned Modality Interventions   Planned Modality Interventions Electrical stimulation;Cryotherapy;Hot packs;TENS;Ultrasound   Clinical Impression   Criteria for Skilled Therapeutic Interventions Met yes, treatment indicated   PT Diagnosis Low back pain   Influenced by the following impairments Pain, decreased ROM, impaired lumbar motor control.   Functional limitations due to impairments Lifting, squating, bending.   Clinical  Presentation Stable/Uncomplicated   Clinical Presentation Rationale Clinical judgement   Clinical Decision Making (Complexity) Low complexity   Therapy Frequency 1 time/week   Predicted Duration of Therapy Intervention (days/wks) 8 weeks   Risk & Benefits of therapy have been explained Yes   Patient, Family & other staff in agreement with plan of care Yes   Clinical Impression Comments Pt is a 37 y.o. female who presented to PT with symptoms of Low back pain.  Pt will benefit from skilled PT to improve lumbar motor control.   Education Assessment   Preferred Learning Style Demonstration   Barriers to Learning No barriers   Total Evaluation Time   PT Eval, Low Complexity Minutes (84424) 15

## 2022-10-25 ENCOUNTER — VIRTUAL VISIT (OUTPATIENT)
Dept: BEHAVIORAL HEALTH | Facility: CLINIC | Age: 37
End: 2022-10-25
Payer: COMMERCIAL

## 2022-10-25 DIAGNOSIS — F32.A DEPRESSION, UNSPECIFIED DEPRESSION TYPE: ICD-10-CM

## 2022-10-25 DIAGNOSIS — F41.1 GAD (GENERALIZED ANXIETY DISORDER): Primary | ICD-10-CM

## 2022-10-25 PROCEDURE — 90837 PSYTX W PT 60 MINUTES: CPT | Mod: 95 | Performed by: MARRIAGE & FAMILY THERAPIST

## 2022-10-25 ASSESSMENT — COLUMBIA-SUICIDE SEVERITY RATING SCALE - C-SSRS
TOTAL  NUMBER OF INTERRUPTED ATTEMPTS LIFETIME: NO
2. HAVE YOU ACTUALLY HAD ANY THOUGHTS OF KILLING YOURSELF?: NO
TOTAL  NUMBER OF PREPARATORY ACTS LIFETIME: 1
TOTAL  NUMBER OF INTERRUPTED ATTEMPTS PAST 3 MONTHS: NO
1. IN THE PAST MONTH, HAVE YOU WISHED YOU WERE DEAD OR WISHED YOU COULD GO TO SLEEP AND NOT WAKE UP?: NO
TOTAL  NUMBER OF ABORTED OR SELF INTERRUPTED ATTEMPTS LIFETIME: 1
6. HAVE YOU EVER DONE ANYTHING, STARTED TO DO ANYTHING, OR PREPARED TO DO ANYTHING TO END YOUR LIFE?: YES
1. HAVE YOU WISHED YOU WERE DEAD OR WISHED YOU COULD GO TO SLEEP AND NOT WAKE UP?: YES
REASONS FOR IDEATION PAST MONTH: DOES NOT APPLY
TOTAL  NUMBER OF ABORTED OR SELF INTERRUPTED ATTEMPTS LIFETIME: YES
REASONS FOR IDEATION LIFETIME: COMPLETELY TO END OR STOP THE PAIN (YOU COULDN'T GO ON LIVING WITH THE PAIN OR HOW YOU WERE FEELING)
6. HAVE YOU EVER DONE ANYTHING, STARTED TO DO ANYTHING, OR PREPARED TO DO ANYTHING TO END YOUR LIFE?: NO
TOTAL  NUMBER OF ABORTED OR SELF INTERRUPTED ATTEMPTS PAST 3 MONTHS: NO
ATTEMPT LIFETIME: NO

## 2022-10-25 ASSESSMENT — ANXIETY QUESTIONNAIRES
5. BEING SO RESTLESS THAT IT IS HARD TO SIT STILL: NOT AT ALL
6. BECOMING EASILY ANNOYED OR IRRITABLE: MORE THAN HALF THE DAYS
GAD7 TOTAL SCORE: 11
7. FEELING AFRAID AS IF SOMETHING AWFUL MIGHT HAPPEN: MORE THAN HALF THE DAYS
GAD7 TOTAL SCORE: 11
3. WORRYING TOO MUCH ABOUT DIFFERENT THINGS: MORE THAN HALF THE DAYS
IF YOU CHECKED OFF ANY PROBLEMS ON THIS QUESTIONNAIRE, HOW DIFFICULT HAVE THESE PROBLEMS MADE IT FOR YOU TO DO YOUR WORK, TAKE CARE OF THINGS AT HOME, OR GET ALONG WITH OTHER PEOPLE: VERY DIFFICULT
8. IF YOU CHECKED OFF ANY PROBLEMS, HOW DIFFICULT HAVE THESE MADE IT FOR YOU TO DO YOUR WORK, TAKE CARE OF THINGS AT HOME, OR GET ALONG WITH OTHER PEOPLE?: VERY DIFFICULT
4. TROUBLE RELAXING: SEVERAL DAYS
7. FEELING AFRAID AS IF SOMETHING AWFUL MIGHT HAPPEN: MORE THAN HALF THE DAYS
2. NOT BEING ABLE TO STOP OR CONTROL WORRYING: MORE THAN HALF THE DAYS
1. FEELING NERVOUS, ANXIOUS, OR ON EDGE: MORE THAN HALF THE DAYS
GAD7 TOTAL SCORE: 11

## 2022-10-25 ASSESSMENT — PATIENT HEALTH QUESTIONNAIRE - PHQ9
SUM OF ALL RESPONSES TO PHQ QUESTIONS 1-9: 6
SUM OF ALL RESPONSES TO PHQ QUESTIONS 1-9: 6
10. IF YOU CHECKED OFF ANY PROBLEMS, HOW DIFFICULT HAVE THESE PROBLEMS MADE IT FOR YOU TO DO YOUR WORK, TAKE CARE OF THINGS AT HOME, OR GET ALONG WITH OTHER PEOPLE: SOMEWHAT DIFFICULT

## 2022-10-25 NOTE — PROGRESS NOTES
[facility] Primary Care: Integrated Behavioral Health  October 25, 2022      Behavioral Health Clinician Progress Note    Patient Name: Amy Moore           Service Type:  Individual      Service Location:   Face to Face in Home / Community via telemedicine     Session Start Time: 3:38pm  Session End Time: 4:45pm      Session Length: 53 - 60      Attendees: Patient     Service Modality:  Video Visit:      Provider verified identity through the following two step process.  Patient provided:  Patient is known previously to provider    Telemedicine Visit: The patient's condition can be safely assessed and treated via synchronous audio and visual telemedicine encounter.      Reason for Telemedicine Visit: Patient convenience (e.g. access to timely appointments / distance to available provider)    Originating Site (Patient Location): Patient's other parked car    Distant Site (Provider Location): Provider Remote Setting- Home Office    Consent:  The patient/guardian has verbally consented to: the potential risks and benefits of telemedicine (video visit) versus in person care; bill my insurance or make self-payment for services provided; and responsibility for payment of non-covered services.     Patient would like the video invitation sent by:  My Chart    Mode of Communication:  Video Conference via Amwell    Distant Location (Provider):  Off-site    As the provider I attest to compliance with applicable laws and regulations related to telemedicine.    Visit Activities (Refresh list every visit): [facility] and [facility] Only    Diagnostic Assessment Date: not completed due to patient clinical needs  Treatment Plan Review Date: due after DA is complete  See Flowsheets for today's PHQ-9 and LORI-7 results  Previous PHQ-9:   PHQ-9 SCORE 9/7/2017 10/25/2022   PHQ-9 Total Score MyChart - 6 (Mild depression)   PHQ-9 Total Score 6 6     Previous LORI-7:   LORI-7 SCORE 9/7/2017 5/13/2021 10/25/2022   Total Score  "- - 11 (moderate anxiety)   Total Score 15 12 11       OWEN LEVEL:  OWEN Score (Last Two) 1/12/2016   OWEN Raw Score 42   Activation Score 66   OWEN Level 3       DATA  Extended Session (60+ minutes): PROLONGED SERVICE IN THE OUTPATIENT SETTING REQUIRING DIRECT (FACE-TO-FACE) PATIENT CONTACT BEYOND THE USUAL SERVICE:    - Longer session due to limited access to mental health appointments and necessity to address patient's distress / complexity  Interactive Complexity: No  Crisis: No  [facility] Patient: No    Treatment Objective(s) Addressed in This Session:  Target Behavior(s): anxiety and relationship issues    Anxiety: will develop more effective coping skills to manage anxiety symptoms  Relationship Problems: will address relationship difficulties in a more adaptive manner    Current Stressors / Issues:  Patient was referred to [facility] by her PCP. Patient is wanting to address anxiety and depression symptoms as well as work on improving her marriage. Patient reports that she started to experience more anxiety at the end of 2016. She reports that she worries about \"many things\". She denies having any panic attacks. She identified that stress adds to irritability. Patient described her marriage as a \"rollercoaster\".     Patient talked about an event last summer, when she was experiencing distress in her marriage and felt overwhelmed with a lot of stressors and she had thoughts of wanting to leave,. She states that she left a family event early and recalls having thoughts of \"what's the point\". She states that she had written a letter and started driving around and then called a friend. She denied any specific plan. She states that she now replays this day in her head, and identified feelings of guilt about it. She denies having any suicidal thoughts since then. She stopped her medication after this and identified feeling scared that she had those thoughts at all. She states that dying scares her. Patient states that she now " "experiences a recurring dream of sitting in her car on a dirt road and the door opens and she falls into the road. She states that she started having this dream last fall and then became stronger when the anniversary of the event approached.     Patient talked about increased conflict in her marriage. She talked about feeling invalidated and that she often feels that she's \"the problem\" or the one at fault. She states that the tried couples counseling, once, before having kids, but it didn't resolve anything. [facility] recommended that they consider couples counseling and they meet with a different therapist than this writer.     Patient is from [place] and her family still resides there. She states that she is close with her family.     Reinforced patient seeking counseling. Discussed cognitive distortions and encouraged patient to monitor judgmental self talk and encouraged self validation.     Progress on Treatment Objective(s) / Homework:  In development-first visit    Motivational Interviewing    MI Intervention: Expressed Empathy/Understanding, Open-ended questions and Reflections: simple and complex     Change Talk Expressed by the Patient: Desire to change    Provider Response to Change Talk: E - Evoked more info from patient about behavior change, A - Affirmed patient's thoughts, decisions, or attempts at behavior change, R - Reflected patient's change talk and S - Summarized patient's change talk statements    Also provided psychoeducation about behavioral health condition, symptoms, and treatment options    Care Plan review completed: No    Medication Review:  No current psychiatric medications prescribed Patient states that she has previously tried medication, however her spouse is opposed to this. She then tried CBD oil to help manage her symptoms and her spouse became even more angry about this than use of psychotropic medication.     Medication Compliance:  NA    Changes in Health Issues:   None " "reported    Chemical Use Review:   Substance Use: Chemical use reviewed, no active concerns identified      Tobacco Use: No current tobacco use.      Assessment: Current Emotional / Mental Status (status of significant symptoms):  Risk status (Self / Other harm or suicidal ideation)  Patient has had a history of suicidal ideation: Over a year ago patient experienced passive suicidal thoughts. July 2021 she had the intrusive thought when there was increased marital distress and stress in general; she left a family event and wrote a letter and then went for a drive thinking \"what's the point\". She called her friend and that was when she realized what she was doing and stopped and denies a history of suicide attempts, self-injurious behavior, homicidal ideation, homicidal behavior and and other safety concerns  Patient denies current fears or concerns for personal safety.  Patient denies current or recent suicidal ideation or behaviors.  Patient denies current or recent homicidal ideation or behaviors.  Patient denies current or recent self injurious behavior or ideation.  Patient denies other safety concerns.  A safety and risk management plan has been developed including: patient agrees to call her mom, call her friend, call 911 and present to the ER    Appearance:   Appropriate   Eye Contact:   Fair   Psychomotor Behavior: Normal   Attitude:   Cooperative  Friendly Pleasant  Orientation:   All  Speech   Rate / Production: Normal/ Responsive   Volume:  Normal   Mood:    Anxious  Sad   Affect:    Appropriate  Tearful  Thought Content:  Perservative  Rumination   Thought Form:  Coherent  Logical  Circumstantial  Insight:    Good     Diagnoses:  1. LORI (generalized anxiety disorder)    2. Depression, unspecified depression type        Collateral Reports Completed:  CSSR-S, GAD7, PHQ9    Plan: (Homework, other):  Patient was given information about behavioral services and encouraged to schedule a follow up appointment " with the clinic [facility] in 2 weeks.  She was also given information about mental health symptoms and treatment options  and Cognitive Behavioral Therapy skills to practice when experiencing anxiety and depression.  CD Recommendations: No indications of CD issues. Patient will connect with her supports. She will self monitor judgmental statements and work on positive affirmations.    JAYDEN Day, [facility]

## 2022-10-27 ENCOUNTER — HOSPITAL ENCOUNTER (OUTPATIENT)
Dept: PHYSICAL THERAPY | Facility: CLINIC | Age: 37
Setting detail: THERAPIES SERIES
Discharge: HOME | End: 2022-10-27
Attending: FAMILY MEDICINE
Payer: COMMERCIAL

## 2022-10-27 PROCEDURE — 97110 THERAPEUTIC EXERCISES: CPT | Mod: GP | Performed by: PHYSICAL THERAPIST

## 2022-12-12 NOTE — PROGRESS NOTES
[facility] Rehabilitation Service    Outpatient Physical Therapy Discharge Note  Patient: Amy Moore  : 1985    Beginning/End Dates of Reporting Period:  10/13/22 to 10/27/22    Referring Provider: Dr. Nguyễn    Therapy Diagnosis: Hip pain.     Client Self Report: Pt denies pain today.  Pt has felt mostly pain free over the past 2 weeks with improved tolerance to all ADL's.  Pt reports eagerness to return to exercise program.    Objective Measurements:   SI joint mobility: WNL      Outcome Measures (most recent score):  Not assessed at final session.    Goals:  Goal Identifier  HEP   Goal Description Pt will be independent with HEP in order to improve lumbar motor control.   Target Date  22   Date Met   10/27/22   Progress (detail required for progress note):           Plan:  Discharge from therapy.    Discharge:    Reason for Discharge: Patient has met all goals.    Equipment Issued: None.    Discharge Plan: Patient to continue home program.

## 2023-01-04 ENCOUNTER — VIRTUAL VISIT (OUTPATIENT)
Dept: BEHAVIORAL HEALTH | Facility: CLINIC | Age: 38
End: 2023-01-04
Payer: COMMERCIAL

## 2023-01-04 DIAGNOSIS — F32.A DEPRESSION, UNSPECIFIED DEPRESSION TYPE: ICD-10-CM

## 2023-01-04 DIAGNOSIS — F41.1 GAD (GENERALIZED ANXIETY DISORDER): Primary | ICD-10-CM

## 2023-01-04 PROCEDURE — 90837 PSYTX W PT 60 MINUTES: CPT | Mod: 95 | Performed by: MARRIAGE & FAMILY THERAPIST

## 2023-01-04 NOTE — PROGRESS NOTES
[facility] Primary Care: Integrated Behavioral Health  January 4, 2023      Behavioral Health Clinician Progress Note    Patient Name: Amy Moore           Service Type:  Individual      Service Location:   Face to Face in Home / Community via telemedicine     Session Start Time: 3:06pm  Session End Time: 4:10pm      Session Length: 53 - 60      Attendees: Patient     Service Modality:  Video Visit:      Provider verified identity through the following two step process.  Patient provided:  Patient is known previously to provider    Telemedicine Visit: The patient's condition can be safely assessed and treated via synchronous audio and visual telemedicine encounter.      Reason for Telemedicine Visit: Patient convenience (e.g. access to timely appointments / distance to available provider)    Originating Site (Patient Location): Patient's home    Distant Site (Provider Location): [facility]    Consent:  The patient/guardian has verbally consented to: the potential risks and benefits of telemedicine (video visit) versus in person care; bill my insurance or make self-payment for services provided; and responsibility for payment of non-covered services.     Patient would like the video invitation sent by:  My Chart    Mode of Communication:  Video Conference via Amwell    Distant Location (Provider):  On-site    As the provider I attest to compliance with applicable laws and regulations related to telemedicine.    Visit Activities (Refresh list every visit): [facility] Only    Diagnostic Assessment Date: not completed due to patient clinical needs  Treatment Plan Review Date: due after DA is complete  See Flowsheets for today's PHQ-9 and LORI-7 results  Previous PHQ-9:   PHQ-9 SCORE 9/7/2017 10/25/2022   PHQ-9 Total Score MyChart - 6 (Mild depression)   PHQ-9 Total Score 6 6     Previous LORI-7:   LORI-7 SCORE 9/7/2017 5/13/2021 10/25/2022   Total Score - - 11 (moderate  "anxiety)   Total Score 15 12 11       OWEN LEVEL:  OWEN Score (Last Two) 1/12/2016   OWEN Raw Score 42   Activation Score 66   OWEN Level 3       DATA  Extended Session (60+ minutes): PROLONGED SERVICE IN THE OUTPATIENT SETTING REQUIRING DIRECT (FACE-TO-FACE) PATIENT CONTACT BEYOND THE USUAL SERVICE:    - Longer session due to limited access to mental health appointments and necessity to address patient's distress / complexity  Interactive Complexity: No  Crisis: No  [facility] Patient: No    Treatment Objective(s) Addressed in This Session:  Target Behavior(s): anxiety and relationship issues    Anxiety: will develop more effective coping skills to manage anxiety symptoms  Relationship Problems: will address relationship difficulties in a more adaptive manner    Current Stressors / Issues:  Patient reports that she's \"good\". Patient identified that after our last visit she is recognizing that she hasn't been placing her needs as a priority. She has been working to be more aware of herself and her moods.     Patient states that she has not had the nightmare or dream since she shared it with this writer.     Patient reports that her spouse is open to couples counseling. She is wanting this to help them grow together, rather than be a blame game.     Patient processed dynamics between her and her spouse. She talked about how she will ask questions about different things and he seems to become defensive. Discussed concerns with trust and ways to communicate effectively.    Progress on Treatment Objective(s) / Homework:  Minimal progress - ACTION (Actively working towards change); Intervened by reinforcing change plan / affirming steps taken    Motivational Interviewing    MI Intervention: Expressed Empathy/Understanding, Open-ended questions and Reflections: simple and complex     Change Talk Expressed by the Patient: Desire to change    Provider Response to Change Talk: E - Evoked more info from patient about behavior change, A " "- Affirmed patient's thoughts, decisions, or attempts at behavior change, R - Reflected patient's change talk and S - Summarized patient's change talk statements    Also provided psychoeducation about behavioral health condition, symptoms, and treatment options    Care Plan review completed: No    Medication Review:  No current psychiatric medications prescribed Patient states that she has previously tried medication, however her spouse is opposed to this. She then tried CBD oil to help manage her symptoms and her spouse became even more angry about this than use of psychotropic medication.     Medication Compliance:  NA    Changes in Health Issues:   None reported    Chemical Use Review:   Substance Use: Chemical use reviewed, no active concerns identified      Tobacco Use: No current tobacco use.      Assessment: Current Emotional / Mental Status (status of significant symptoms):  Risk status (Self / Other harm or suicidal ideation)  Patient has had a history of suicidal ideation: Over a year ago patient experienced passive suicidal thoughts. July 2021 she had the intrusive thought when there was increased marital distress and stress in general; she left a family event and wrote a letter and then went for a drive thinking \"what's the point\". She called her friend and that was when she realized what she was doing and stopped and denies a history of suicide attempts, self-injurious behavior, homicidal ideation, homicidal behavior and and other safety concerns  Patient denies current fears or concerns for personal safety.  Patient denies current or recent suicidal ideation or behaviors.  Patient denies current or recent homicidal ideation or behaviors.  Patient denies current or recent self injurious behavior or ideation.  Patient denies other safety concerns.  A safety and risk management plan has been developed including: patient agrees to call her mom, call her friend, call 911 and present to the " ER    Appearance:   Appropriate   Eye Contact:   Fair   Psychomotor Behavior: Normal   Attitude:   Cooperative  Friendly Pleasant  Orientation:   All  Speech   Rate / Production: Normal/ Responsive   Volume:  Normal   Mood:    Anxious   Affect:    Appropriate   Thought Content:  Perseverative Rumination   Thought Form:  Coherent  Logical  Circumstantial  Insight:    Good     Diagnoses:  1. LORI (generalized anxiety disorder)    2. Depression, unspecified depression type        Collateral Reports Completed:  Not Applicable    Plan: (Homework, other):  Patient was given information about behavioral services and encouraged to schedule a follow up appointment with the clinic [facility] in 2 weeks.  She was also given information about mental health symptoms and treatment options  and Cognitive Behavioral Therapy skills to practice when experiencing anxiety and depression.  CD Recommendations: No indications of CD issues. Patient will connect with her supports. She will self monitor judgmental statements and work on positive affirmations.    JAYDEN Day, [facility]

## 2023-01-19 ENCOUNTER — VIRTUAL VISIT (OUTPATIENT)
Dept: BEHAVIORAL HEALTH | Facility: CLINIC | Age: 38
End: 2023-01-19
Payer: COMMERCIAL

## 2023-01-19 DIAGNOSIS — F32.A DEPRESSION, UNSPECIFIED DEPRESSION TYPE: ICD-10-CM

## 2023-01-19 DIAGNOSIS — F41.1 GAD (GENERALIZED ANXIETY DISORDER): Primary | ICD-10-CM

## 2023-01-19 PROCEDURE — 90834 PSYTX W PT 45 MINUTES: CPT | Mod: 95 | Performed by: MARRIAGE & FAMILY THERAPIST

## 2023-01-19 NOTE — PROGRESS NOTES
[facility]: Integrated Behavioral Health  January 19, 2023      Behavioral Health Clinician Progress Note    Patient Name: Amy Moore           Service Type:  Individual      Service Location:   Face to Face in Home / Community via telemedicine     Session Start Time: 3:09pm  Session End Time: 4:00pm      Session Length: 38 - 52      Attendees: Patient     Service Modality:  Video Visit:      Provider verified identity through the following two step process.  Patient provided:  Patient is known previously to provider    Telemedicine Visit: The patient's condition can be safely assessed and treated via synchronous audio and visual telemedicine encounter.      Reason for Telemedicine Visit: Patient convenience (e.g. access to timely appointments / distance to available provider)    Originating Site (Patient Location): Patient's home    Distant Site (Provider Location): Provider Remote Setting- Home Office    Consent:  The patient/guardian has verbally consented to: the potential risks and benefits of telemedicine (video visit) versus in person care; bill my insurance or make self-payment for services provided; and responsibility for payment of non-covered services.     Patient would like the video invitation sent by:  My Chart    Mode of Communication:  Video Conference via Amwell    Distant Location (Provider):  Off-site    As the provider I attest to compliance with applicable laws and regulations related to telemedicine.    Visit Activities (Refresh list every visit): [facility] Only    Diagnostic Assessment Date: not completed due to patient clinical needs  Treatment Plan Review Date: due after DA is complete  See Flowsheets for today's PHQ-9 and LORI-7 results  Previous PHQ-9:   PHQ-9 SCORE 9/7/2017 10/25/2022   PHQ-9 Total Score MyChart - 6 (Mild depression)   PHQ-9 Total Score 6 6     Previous LORI-7:   LORI-7 SCORE 9/7/2017 5/13/2021 10/25/2022   Total Score - - 11 (moderate  "anxiety)   Total Score 15 12 11       OEWN LEVEL:  OWEN Score (Last Two) 1/12/2016   OWEN Raw Score 42   Activation Score 66   OWEN Level 3       DATA  Extended Session (60+ minutes): No  Interactive Complexity: No  Crisis: No  H Patient: No    Treatment Objective(s) Addressed in This Session:  Target Behavior(s): anxiety and relationship issues    Anxiety: will develop more effective coping skills to manage anxiety symptoms  Relationship Problems: will address relationship difficulties in a more adaptive manner    Current Stressors / Issues:  Patient reports she had a good week with her kids, however her spouse was not around. She expressed concern about feeling that way. She reflected that there was no conflict with her spouse being out of town.     Patient is doing something for herself tomorrow, lunch and pedicures with her friend. Reinforced patient taking time for herself and doing some self care.     Patient processed recent dynamics with her spouse. She states that things are better, and she's trying to let things go more. Explored what \"letting things go\" meant. Patient stated that she's working to not let small things bother her. Encouraged patient to take time to reflect on what would be important for her to discuss versus when it would be okay to let something \"go\".     Patient asked about a couples therapist recommendation. Provider gave the number for scheduling along with a couple of names.    Progress on Treatment Objective(s) / Homework:  Satisfactory progress - ACTION (Actively working towards change); Intervened by reinforcing change plan / affirming steps taken    Motivational Interviewing    MI Intervention: Expressed Empathy/Understanding, Open-ended questions and Reflections: simple and complex     Change Talk Expressed by the Patient: Desire to change    Provider Response to Change Talk: E - Evoked more info from patient about behavior change, A - Affirmed patient's thoughts, decisions, or " "attempts at behavior change, R - Reflected patient's change talk and S - Summarized patient's change talk statements    Also provided psychoeducation about behavioral health condition, symptoms, and treatment options    Care Plan review completed: No    Medication Review:  No current psychiatric medications prescribed Patient states that she has previously tried medication, however her spouse is opposed to this. She then tried CBD oil to help manage her symptoms and her spouse became even more angry about this than use of psychotropic medication.     Medication Compliance:  NA    Changes in Health Issues:   None reported    Chemical Use Review:   Substance Use: Chemical use reviewed, no active concerns identified      Tobacco Use: No current tobacco use.      Assessment: Current Emotional / Mental Status (status of significant symptoms):  Risk status (Self / Other harm or suicidal ideation)  Patient has had a history of suicidal ideation: Over a year ago patient experienced passive suicidal thoughts. July 2021 she had the intrusive thought when there was increased marital distress and stress in general; she left a family event and wrote a letter and then went for a drive thinking \"what's the point\". She called her friend and that was when she realized what she was doing and stopped and denies a history of suicide attempts, self-injurious behavior, homicidal ideation, homicidal behavior and and other safety concerns  Patient denies current fears or concerns for personal safety.  Patient denies current or recent suicidal ideation or behaviors.  Patient denies current or recent homicidal ideation or behaviors.  Patient denies current or recent self injurious behavior or ideation.  Patient denies other safety concerns.  A safety and risk management plan has been developed including: patient agrees to call her mom, call her friend, call 911 and present to the ER    Appearance:   Appropriate   Eye Contact:   Fair "   Psychomotor Behavior: Normal   Attitude:   Cooperative  Friendly Pleasant  Orientation:   All  Speech   Rate / Production: Normal/ Responsive   Volume:  Normal   Mood:    Anxious   Affect:    Appropriate   Thought Content:  Perseverative Rumination   Thought Form:  Coherent  Logical  Circumstantial  Insight:    Good     Diagnoses:  1. LORI (generalized anxiety disorder)    2. Depression, unspecified depression type        Collateral Reports Completed:  Not Applicable    Plan: (Homework, other):  Patient was given information about behavioral services and encouraged to schedule a follow up appointment with the clinic [facility] in 2 weeks.  She was also given information about mental health symptoms and treatment options  and Cognitive Behavioral Therapy skills to practice when experiencing anxiety and depression.  CD Recommendations: No indications of CD issues. Patient will connect with her supports. She will self monitor judgmental statements and work on positive affirmations.    JAYDEN Day, [facility]

## 2023-02-09 ENCOUNTER — VIRTUAL VISIT (OUTPATIENT)
Dept: BEHAVIORAL HEALTH | Facility: CLINIC | Age: 38
End: 2023-02-09
Payer: COMMERCIAL

## 2023-02-09 DIAGNOSIS — F41.1 GAD (GENERALIZED ANXIETY DISORDER): Primary | ICD-10-CM

## 2023-02-09 DIAGNOSIS — F32.A DEPRESSION, UNSPECIFIED DEPRESSION TYPE: ICD-10-CM

## 2023-02-09 PROCEDURE — 90834 PSYTX W PT 45 MINUTES: CPT | Mod: VID | Performed by: MARRIAGE & FAMILY THERAPIST

## 2023-02-09 NOTE — PROGRESS NOTES
[facility]: Integrated Behavioral Health  February 9, 2023      Behavioral Health Clinician Progress Note    Patient Name: Amy Moore           Service Type:  Individual      Service Location:   Face to Face in Home / Community via telemedicine     Session Start Time: 3:19pm  Session End Time: 4:05pm      Session Length: 38 - 52      Attendees: Patient     Service Modality:  Video Visit:      Provider verified identity through the following two step process.  Patient provided:  Patient is known previously to provider    Telemedicine Visit: The patient's condition can be safely assessed and treated via synchronous audio and visual telemedicine encounter.      Reason for Telemedicine Visit: Patient convenience (e.g. access to timely appointments / distance to available provider)    Originating Site (Patient Location): Patient's other : parked car    Distant Site (Provider Location): Provider Remote Setting- Home Office    Consent:  The patient/guardian has verbally consented to: the potential risks and benefits of telemedicine (video visit) versus in person care; bill my insurance or make self-payment for services provided; and responsibility for payment of non-covered services.     Patient would like the video invitation sent by:  My Chart    Mode of Communication:  Video Conference via Amwell    Distant Location (Provider):  Off-site    As the provider I attest to compliance with applicable laws and regulations related to telemedicine.    Visit Activities (Refresh list every visit): [facility] Only    Diagnostic Assessment Date: not completed due to patient clinical needs  Treatment Plan Review Date: due after DA is complete  See Flowsheets for today's PHQ-9 and LORI-7 results  Previous PHQ-9:   PHQ-9 SCORE 9/7/2017 10/25/2022   PHQ-9 Total Score MyChart - 6 (Mild depression)   PHQ-9 Total Score 6 6     Previous LORI-7:   LORI-7 SCORE 9/7/2017 5/13/2021 10/25/2022   Total Score - -  "11 (moderate anxiety)   Total Score 15 12 11       OWEN LEVEL:  OWEN Score (Last Two) 1/12/2016   OWEN Raw Score 42   Activation Score 66   OWEN Level 3       DATA  Extended Session (60+ minutes): No  Interactive Complexity: No  Crisis: No  [facility] Patient: No    Treatment Objective(s) Addressed in This Session:  Target Behavior(s): anxiety and relationship issues    Anxiety: will develop more effective coping skills to manage anxiety symptoms  Relationship Problems: will address relationship difficulties in a more adaptive manner    Current Stressors / Issues:  Patient reports that she's \"pretty good\". She states that she felt more stress last week. She states that they were shopping at the mall and they lost their daughter. Patient processed the situation. She stated that after they found her daughter she later was sad and felt panicked. She reports that after they returned home, she had a panic attack: crying uncontrollably, hyperventilating, and couldn't talk. She reflected that she continued to think about what could have happened and had ongoing thoughts of impending doom. Patient states that she talked with her mom, whom had experienced the same thing with patient's sister when they were younger.     Patient reports that she talked with her spouse about dynamics in their marriage. She reports that things have improved since she opened up.     Patient processed dynamics with her sisters. She reports being closer with her older sister, and not as close with her middle sister. She has learned that her middle sister is going through some medical stuff and she's not sure how to address it with her. Discussed boundary setting and explored options of things to say that fit with patient's values.    Progress on Treatment Objective(s) / Homework:  Satisfactory progress - ACTION (Actively working towards change); Intervened by reinforcing change plan / affirming steps taken    Motivational Interviewing    MI Intervention: " "Expressed Empathy/Understanding, Open-ended questions and Reflections: simple and complex     Change Talk Expressed by the Patient: Desire to change Activation    Provider Response to Change Talk: E - Evoked more info from patient about behavior change, A - Affirmed patient's thoughts, decisions, or attempts at behavior change, R - Reflected patient's change talk and S - Summarized patient's change talk statements    Also provided psychoeducation about behavioral health condition, symptoms, and treatment options    Care Plan review completed: No    Medication Review:  No current psychiatric medications prescribed     Medication Compliance:  NA    Changes in Health Issues:   None reported    Chemical Use Review:   Substance Use: Chemical use reviewed, no active concerns identified      Tobacco Use: No current tobacco use.      Assessment: Current Emotional / Mental Status (status of significant symptoms):  Risk status (Self / Other harm or suicidal ideation)  Patient has had a history of suicidal ideation: Over a year ago patient experienced passive suicidal thoughts. July 2021 she had the intrusive thought when there was increased marital distress and stress in general; she left a family event and wrote a letter and then went for a drive thinking \"what's the point\". She called her friend and that was when she realized what she was doing and stopped and denies a history of suicide attempts, self-injurious behavior, homicidal ideation, homicidal behavior and and other safety concerns  Patient denies current fears or concerns for personal safety.  Patient denies current or recent suicidal ideation or behaviors.  Patient denies current or recent homicidal ideation or behaviors.  Patient denies current or recent self injurious behavior or ideation.  Patient denies other safety concerns.  A safety and risk management plan has been developed including: patient agrees to call her mom, call her friend, call 911 and present " to the ER    Appearance:   Appropriate   Eye Contact:   Fair   Psychomotor Behavior: Normal   Attitude:   Cooperative  Friendly Pleasant  Orientation:   All  Speech   Rate / Production: Normal/ Responsive   Volume:  Normal   Mood:    Anxious   Affect:    Appropriate   Thought Content:  Perseverative Rumination   Thought Form:  Coherent  Logical  Circumstantial  Insight:    Good     Diagnoses:  1. LORI (generalized anxiety disorder)    2. Depression, unspecified depression type        Collateral Reports Completed:  Not Applicable    Plan: (Homework, other):  Patient was given information about behavioral services and encouraged to schedule a follow up appointment with the clinic [facility] in 2 weeks.  She was also given information about mental health symptoms and treatment options  and Cognitive Behavioral Therapy skills to practice when experiencing anxiety and depression.  CD Recommendations: No indications of CD issues. Patient will connect with her supports. She will self monitor judgmental statements and work on positive affirmations. Patient will work on boundaries with her sister. Patient will work on use of deep breathing for reducing anxiety symptoms.     JAYDEN Day, [facility]

## 2023-12-10 ENCOUNTER — HEALTH MAINTENANCE LETTER (OUTPATIENT)
Age: 38
End: 2023-12-10

## 2023-12-12 ASSESSMENT — ENCOUNTER SYMPTOMS
HEMATOCHEZIA: 0
EYE PAIN: 0
PALPITATIONS: 0
COUGH: 0
DIARRHEA: 0
NERVOUS/ANXIOUS: 1
PARESTHESIAS: 0
ARTHRALGIAS: 0
CONSTIPATION: 0
SORE THROAT: 0
HEMATURIA: 0
JOINT SWELLING: 0
MYALGIAS: 0
ABDOMINAL PAIN: 0
NAUSEA: 0
FREQUENCY: 0
HEARTBURN: 0
WEAKNESS: 0
HEADACHES: 0
CHILLS: 0
DIZZINESS: 0
BREAST MASS: 0
DYSURIA: 0
SHORTNESS OF BREATH: 0
FEVER: 0

## 2023-12-13 ENCOUNTER — OFFICE VISIT (OUTPATIENT)
Dept: FAMILY MEDICINE | Facility: CLINIC | Age: 38
End: 2023-12-13
Payer: COMMERCIAL

## 2023-12-13 VITALS
HEIGHT: 67 IN | BODY MASS INDEX: 32.1 KG/M2 | RESPIRATION RATE: 16 BRPM | OXYGEN SATURATION: 100 % | DIASTOLIC BLOOD PRESSURE: 80 MMHG | TEMPERATURE: 97.9 F | WEIGHT: 204.5 LBS | HEART RATE: 80 BPM | SYSTOLIC BLOOD PRESSURE: 128 MMHG

## 2023-12-13 DIAGNOSIS — Z00.00 ROUTINE GENERAL MEDICAL EXAMINATION AT A HEALTH CARE FACILITY: Primary | ICD-10-CM

## 2023-12-13 DIAGNOSIS — E66.811 CLASS 1 OBESITY WITH SERIOUS COMORBIDITY AND BODY MASS INDEX (BMI) OF 32.0 TO 32.9 IN ADULT, UNSPECIFIED OBESITY TYPE: ICD-10-CM

## 2023-12-13 DIAGNOSIS — Z13.29 SCREENING FOR THYROID DISORDER: ICD-10-CM

## 2023-12-13 DIAGNOSIS — Z13.220 SCREENING FOR LIPID DISORDERS: ICD-10-CM

## 2023-12-13 DIAGNOSIS — Z12.4 CERVICAL CANCER SCREENING: ICD-10-CM

## 2023-12-13 DIAGNOSIS — Z13.1 SCREENING FOR DIABETES MELLITUS: ICD-10-CM

## 2023-12-13 DIAGNOSIS — F41.1 GAD (GENERALIZED ANXIETY DISORDER): ICD-10-CM

## 2023-12-13 LAB
CHOLEST SERPL-MCNC: 174 MG/DL
FASTING STATUS PATIENT QL REPORTED: NO
HBA1C MFR BLD: 5.1 %
HDLC SERPL-MCNC: 45 MG/DL
LDLC SERPL CALC-MCNC: 106 MG/DL
NONHDLC SERPL-MCNC: 129 MG/DL
TRIGL SERPL-MCNC: 113 MG/DL
TSH SERPL DL<=0.005 MIU/L-ACNC: 2.49 UIU/ML (ref 0.3–4.2)

## 2023-12-13 PROCEDURE — 80061 LIPID PANEL: CPT | Performed by: STUDENT IN AN ORGANIZED HEALTH CARE EDUCATION/TRAINING PROGRAM

## 2023-12-13 PROCEDURE — G0480 DRUG TEST DEF 1-7 CLASSES: HCPCS | Mod: 90 | Performed by: STUDENT IN AN ORGANIZED HEALTH CARE EDUCATION/TRAINING PROGRAM

## 2023-12-13 PROCEDURE — G0124 SCREEN C/V THIN LAYER BY MD: HCPCS | Performed by: PATHOLOGY

## 2023-12-13 PROCEDURE — 87624 HPV HI-RISK TYP POOLED RSLT: CPT | Performed by: STUDENT IN AN ORGANIZED HEALTH CARE EDUCATION/TRAINING PROGRAM

## 2023-12-13 PROCEDURE — 99395 PREV VISIT EST AGE 18-39: CPT | Performed by: STUDENT IN AN ORGANIZED HEALTH CARE EDUCATION/TRAINING PROGRAM

## 2023-12-13 PROCEDURE — 83036 HEMOGLOBIN GLYCOSYLATED A1C: CPT | Performed by: STUDENT IN AN ORGANIZED HEALTH CARE EDUCATION/TRAINING PROGRAM

## 2023-12-13 PROCEDURE — 36415 COLL VENOUS BLD VENIPUNCTURE: CPT | Performed by: STUDENT IN AN ORGANIZED HEALTH CARE EDUCATION/TRAINING PROGRAM

## 2023-12-13 PROCEDURE — G0145 SCR C/V CYTO,THINLAYER,RESCR: HCPCS | Performed by: STUDENT IN AN ORGANIZED HEALTH CARE EDUCATION/TRAINING PROGRAM

## 2023-12-13 PROCEDURE — 99212 OFFICE O/P EST SF 10 MIN: CPT | Mod: 25 | Performed by: STUDENT IN AN ORGANIZED HEALTH CARE EDUCATION/TRAINING PROGRAM

## 2023-12-13 PROCEDURE — 84443 ASSAY THYROID STIM HORMONE: CPT | Performed by: STUDENT IN AN ORGANIZED HEALTH CARE EDUCATION/TRAINING PROGRAM

## 2023-12-13 PROCEDURE — 99000 SPECIMEN HANDLING OFFICE-LAB: CPT | Performed by: STUDENT IN AN ORGANIZED HEALTH CARE EDUCATION/TRAINING PROGRAM

## 2023-12-13 ASSESSMENT — ENCOUNTER SYMPTOMS
HEADACHES: 0
HEMATURIA: 0
ABDOMINAL PAIN: 0
DIZZINESS: 0
DYSURIA: 0
JOINT SWELLING: 0
SHORTNESS OF BREATH: 0
CONSTIPATION: 0
BREAST MASS: 0
COUGH: 0
PALPITATIONS: 0
NERVOUS/ANXIOUS: 1
HEMATOCHEZIA: 0
MYALGIAS: 0
WEAKNESS: 0
HEARTBURN: 0
DIARRHEA: 0
FEVER: 0
NAUSEA: 0
SORE THROAT: 0
EYE PAIN: 0
PARESTHESIAS: 0
FREQUENCY: 0
ARTHRALGIAS: 0
CHILLS: 0

## 2023-12-13 ASSESSMENT — PAIN SCALES - GENERAL: PAINLEVEL: NO PAIN (0)

## 2023-12-13 NOTE — PROGRESS NOTES
SUBJECTIVE:   Amy is a 38 year old, presenting for the following:  Physical        2023     3:42 PM   Additional Questions   Roomed by Selina ARMSTRONG       Healthy Habits:     Getting at least 3 servings of Calcium per day:  Yes    Bi-annual eye exam:  Yes    Dental care twice a year:  Yes    Sleep apnea or symptoms of sleep apnea:  None    Diet:  Regular (no restrictions)    Frequency of exercise:  4-5 days/week    Duration of exercise:  45-60 minutes    Taking medications regularly:  Yes    Medication side effects:  None    Additional concerns today:  No      Today's PHQ-2 Score:       2023     5:15 PM   PHQ-2 (  [employer])   Q1: Little interest or pleasure in doing things 0   Q2: Feeling down, depressed or hopeless 0   PHQ-2 Score 0   Q1: Little interest or pleasure in doing things Not at all   Q2: Feeling down, depressed or hopeless Not at all   PHQ-2 Score 0         Social History     Tobacco Use    Smoking status: Former     Packs/day: 0.00     Years: 1.00     Additional pack years: 0.00     Total pack years: 0.00     Types: Cigarettes     Start date: 2004     Quit date: 2005     Years since quittin.9    Smokeless tobacco: Never    Tobacco comments:     socially in early    Substance Use Topics    Alcohol use: Yes     Comment: socially             2023     5:14 PM   Alcohol Use   Prescreen: >3 drinks/day or >7 drinks/week? No     Reviewed orders with patient.  Reviewed health maintenance and updated orders accordingly - Yes  Labs reviewed in EPIC  BP Readings from Last 3 Encounters:   23 128/80   10/06/22 129/76   21 120/76    Wt Readings from Last 3 Encounters:   23 92.8 kg (204 lb 8 oz)   10/06/22 95.3 kg (210 lb 3.2 oz)   21 96.2 kg (212 lb)                  Patient Active Problem List   Diagnosis    Slow transit constipation    Weight gain    Pelvic pain in female    S/P  section    LORI (generalized anxiety disorder)    Internal  hemorrhoids without complication    High-risk pregnancy    Carpal tunnel syndrome on right    Class 1 obesity due to excess calories without serious comorbidity with body mass index (BMI) of 33.0 to 33.9 in adult    Chronic midline low back pain without sciatica    Bleeding hemorrhoids    Menorrhagia with regular cycle     Past Surgical History:   Procedure Laterality Date     SECTION N/A 2016    Procedure:  SECTION;  Surgeon: Merrick Justice MD;  Location:  [facility]     SECTION N/A 2020    Procedure:  SECTION;  Surgeon: Mello Nguyễn MD;  Location:  [facility]       Social History     Tobacco Use    Smoking status: Former     Packs/day: 0.00     Years: 1.00     Additional pack years: 0.00     Total pack years: 0.00     Types: Cigarettes     Start date: 2004     Quit date: 2005     Years since quittin.9    Smokeless tobacco: Never    Tobacco comments:     socially in early    Substance Use Topics    Alcohol use: Yes     Comment: socially     Family History   Problem Relation Age of Onset    Hypothyroidism Maternal Grandmother     Thyroid Disease Maternal Grandmother     No Known Problems Maternal Grandfather     No Known Problems Paternal Grandmother     Myocardial Infarction Paternal Grandfather 40    Hypothyroidism Mother     Hypertension Mother     Thyroid Disease Mother     No Known Problems Father     Hypothyroidism Sister     Thyroid Disease Sister     Hypothyroidism Sister     Thyroid Disease Sister     No Known Problems Son          No current outpatient medications on file.     No Known Allergies  Recent Labs   Lab Test 23  1704 10/06/22  1606 21  1534 19  1001 16  0943   A1C 5.1  --   --   --   --    *  --   --  95 93   HDL 45*  --   --  47* 61   TRIG 113  --   --  55 64   CR  --  0.77  --   --   --    GFRESTIMATED  --  >90  --   --   --    POTASSIUM  --  3.9  --   --   --    TSH 2.49 2.70   < > 2.35 2.58    < > =  values in this interval not displayed.        Breast Cancer Screenin/12/2021     6:13 PM 2021     3:03 PM   Breast CA Risk Assessment (FHS-7)   Do you have a family history of breast, colon, or ovarian cancer? No / Unknown No / Unknown         Patient under 40 years of age: Routine Mammogram Screening not recommended.   Pertinent mammograms are reviewed under the imaging tab.    History of abnormal Pap smear: YES - most recent screens normal. Due for update. Recommendations for age 30-65 PAP every 5 years with negative HPV co-testing recommended      Latest Ref Rng & Units 2018     9:27 AM 2018     9:25 AM 2016     9:27 AM   PAP / HPV   PAP (Historical)   NIL     HPV 16 DNA NEG^Negative Negative   Negative    HPV 18 DNA NEG^Negative Negative   Negative    Other HR HPV NEG^Negative Negative   Negative      Reviewed and updated as needed this visit by clinical staff   Tobacco  Allergies  Meds  Problems  Med Hx  Surg Hx  Fam Hx          Reviewed and updated as needed this visit by Provider   Tobacco  Allergies  Meds  Problems  Med Hx  Surg Hx  Fam Hx         Past Medical History:   Diagnosis Date    High-risk pregnancy in third trimester 2020    Added automatically from request for surgery [id number]    Pelvic pain in female 2016    Slow transit constipation 2016    Weight gain 2016      Past Surgical History:   Procedure Laterality Date     SECTION N/A 2016    Procedure:  SECTION;  Surgeon: Merrick Justice MD;  Location: [facility]     SECTION N/A 2020    Procedure:  SECTION;  Surgeon: Mello Nguyễn MD;  Location: [facility]     OB History    Para Term  AB Living   2 2 1 1 0 2   SAB IAB Ectopic Multiple Live Births   0 0 0 0 2      # Outcome Date GA Lbr Brennen/2nd Weight Sex Delivery Anes PTL Lv   2 Term 20 38w0d  3.83 kg (8 lb 7.1 oz) F CS-LTranv Spinal N WARREN      Name: FRANCIELIO-LEANDER       "Apgar1: 9  Apgar5: 9   1  16 36w6d  3.02 kg (6 lb 10.5 oz) M CS-LTranv EPI  WARREN      Name: Balta      Apgar1: 9  Apgar5: 9      Obstetric Comments   EDC 2020  Based on early ultrasound.   to Ketan.       Review of Systems   Constitutional:  Negative for chills and fever.   HENT:  Negative for congestion, ear pain, hearing loss and sore throat. Rhinorrhea: cotinine.   Eyes:  Negative for pain and visual disturbance.   Respiratory:  Negative for cough and shortness of breath.    Cardiovascular:  Negative for chest pain, palpitations and peripheral edema.   Gastrointestinal:  Negative for abdominal pain, constipation, diarrhea, heartburn, hematochezia and nausea.   Breasts:  Negative for tenderness, breast mass and discharge.   Genitourinary:  Negative for dysuria, frequency, genital sores, hematuria, pelvic pain, urgency, vaginal bleeding and vaginal discharge.   Musculoskeletal:  Negative for arthralgias, joint swelling and myalgias.   Skin:  Negative for rash.   Neurological:  Negative for dizziness, weakness, headaches and paresthesias.   Psychiatric/Behavioral:  Negative for mood changes. The patient is nervous/anxious.         OBJECTIVE:   /80   Pulse 80   Temp 97.9  F (36.6  C) (Temporal)   Resp 16   Ht 1.69 m (5' 6.54\")   Wt 92.8 kg (204 lb 8 oz)   LMP 2023   SpO2 100%   BMI 32.48 kg/m    Physical Exam  GENERAL: healthy, alert and no distress  EYES: Eyes grossly normal to inspection, PERRL and conjunctivae and sclerae normal  HENT: ear canals and TM's normal, nose and mouth without ulcers or lesions  NECK: no adenopathy, no asymmetry, masses, or scars and thyroid normal to palpation  RESP: lungs clear to auscultation - no rales, rhonchi or wheezes  CV: regular rate and rhythm, no murmur, no peripheral edema   ABDOMEN: soft, nontender, no hepatosplenomegaly, no masses and bowel sounds normal   (female): normal female external genitalia, normal urethral meatus, vaginal " mucosa pink, moist, well rugated, and normal cervix/adnexa/uterus without masses or discharge  MS: no gross musculoskeletal defects noted, no edema  SKIN: no suspicious lesions or rashes  NEURO: Normal strength and tone, mentation intact and speech normal  PSYCH: mentation appears normal, affect normal/bright    Diagnostic Test Results:  Labs reviewed in Epic  Results for orders placed or performed in visit on 12/13/23 (from the past 24 hour(s))   TSH with free T4 reflex   Result Value Ref Range    TSH 2.49 0.30 - 4.20 uIU/mL   Hemoglobin A1c   Result Value Ref Range    Hemoglobin A1C 5.1 <5.7 %   Lipid panel reflex to direct LDL Non-fasting   Result Value Ref Range    Cholesterol 174 <200 mg/dL    Triglycerides 113 <150 mg/dL    Direct Measure HDL 45 (L) >=50 mg/dL    LDL Cholesterol Calculated 106 (H) <=100 mg/dL    Non HDL Cholesterol 129 <130 mg/dL    Patient Fasting > 8hrs? No     Narrative    Cholesterol  Desirable:  <200 mg/dL    Triglycerides  Normal:  Less than 150 mg/dL  Borderline High:  150-199 mg/dL  High:  200-499 mg/dL  Very High:  Greater than or equal to 500 mg/dL    Direct Measure HDL  Female:  Greater than or equal to 50 mg/dL   Male:  Greater than or equal to 40 mg/dL    LDL Cholesterol  Desirable:  <100mg/dL  Above Desirable:  100-129 mg/dL   Borderline High:  130-159 mg/dL   High:  160-189 mg/dL   Very High:  >= 190 mg/dL    Non HDL Cholesterol  Desirable:  130 mg/dL  Above Desirable:  130-159 mg/dL  Borderline High:  160-189 mg/dL  High:  190-219 mg/dL  Very High:  Greater than or equal to 220 mg/dL       ASSESSMENT/PLAN:   (Z00.00) Routine general medical examination at a health care facility  (primary encounter diagnosis)  Comment: Recommend annual wellness visits, screenings, and immunizations as indicated.   Plan: Nicotine and Cotinine Blood, Lipid panel reflex        to direct LDL Non-fasting, Hemoglobin A1c    (F41.1) LORI (generalized anxiety disorder)  Comment: Reports stable currently,  "no medications.    (E66.9,  Z68.32) Class 1 obesity with serious comorbidity and body mass index (BMI) of 32.0 to 32.9 in adult, unspecified obesity type  Comment: Recommend healhty diet and exercise.     (Z12.4) Cervical cancer screening  Comment: Screen.  Plan: Pap Screen with HPV - recommended age 30 - 65         years    (Z13.220) Screening for lipid disorders  Comment: Screen  Plan: Lipid panel reflex to direct LDL Non-fasting    (Z13.29) Screening for thyroid disorder  Comment: screen.  Plan: TSH with free T4 reflex    (Z13.1) Screening for diabetes mellitus  Comment: screen  Plan: Hemoglobin A1c    Patient has been advised of split billing requirements and indicates understanding: Yes      COUNSELING:  Reviewed preventive health counseling, as reflected in patient instructions       Regular exercise       Healthy diet/nutrition       Vision screening       Hearing screening       Immunizations  Declined: Covid-19 and Influenza due to Conscientious objector          BMI:   Estimated body mass index is 32.48 kg/m  as calculated from the following:    Height as of this encounter: 1.69 m (5' 6.54\").    Weight as of this encounter: 92.8 kg (204 lb 8 oz).   Weight management plan: Discussed healthy diet and exercise guidelines      She reports that she quit smoking about 18 years ago. Her smoking use included cigarettes. She started smoking about 19 years ago. She has never used smokeless tobacco.          Penny Bynum,   [facility]  "

## 2023-12-17 LAB
COTININE SERPL-MCNC: <5 NG/ML
NICOTINE SERPL-MCNC: <5 NG/ML

## 2023-12-19 LAB
BKR LAB AP GYN ADEQUACY: ABNORMAL
BKR LAB AP GYN INTERPRETATION: ABNORMAL
BKR LAB AP HPV REFLEX: ABNORMAL
BKR LAB AP LMP: ABNORMAL
BKR LAB AP PREVIOUS ABNORMAL: ABNORMAL
PATH REPORT.COMMENTS IMP SPEC: ABNORMAL
PATH REPORT.COMMENTS IMP SPEC: ABNORMAL
PATH REPORT.RELEVANT HX SPEC: ABNORMAL

## 2023-12-20 LAB
HUMAN PAPILLOMA VIRUS 16 DNA: NEGATIVE
HUMAN PAPILLOMA VIRUS 18 DNA: NEGATIVE
HUMAN PAPILLOMA VIRUS FINAL DIAGNOSIS: NORMAL
HUMAN PAPILLOMA VIRUS OTHER HR: NEGATIVE

## 2023-12-21 PROBLEM — R87.610 ASCUS OF CERVIX WITH NEGATIVE HIGH RISK HPV: Status: ACTIVE | Noted: 2023-12-13

## 2023-12-28 ENCOUNTER — MYC MEDICAL ADVICE (OUTPATIENT)
Dept: FAMILY MEDICINE | Facility: CLINIC | Age: 38
End: 2023-12-28
Payer: COMMERCIAL

## 2023-12-28 ENCOUNTER — TELEPHONE (OUTPATIENT)
Dept: FAMILY MEDICINE | Facility: CLINIC | Age: 38
End: 2023-12-28
Payer: COMMERCIAL

## 2023-12-28 NOTE — TELEPHONE ENCOUNTER
Forms/Letter Request    Type of form/letter:  wellness program    Have you been seen for this request: N/A    Do we have the form/letter: Yes: at front    Who is the form from? Patient    Where did/will the form come from? Patient or family brought in       When is form/letter needed by: asap    How would you like the form/letter returned: Fax : [phone]    Patient Notified form requests are processed in 3-5 business days:Yes    Could we send this information to you in [employer] or would you prefer to receive a phone call?:   No preference   Okay to leave a detailed message?: Yes at Home number on file [phone] (home)     Raquel Morejon MA 12/28/2023

## 2024-01-20 ENCOUNTER — HOSPITAL ENCOUNTER (EMERGENCY)
Facility: CLINIC | Age: 39
Discharge: HOME | End: 2024-01-20
Attending: PHYSICIAN ASSISTANT | Admitting: PHYSICIAN ASSISTANT
Payer: COMMERCIAL

## 2024-01-20 VITALS
HEART RATE: 88 BPM | DIASTOLIC BLOOD PRESSURE: 70 MMHG | RESPIRATION RATE: 16 BRPM | SYSTOLIC BLOOD PRESSURE: 129 MMHG | TEMPERATURE: 98 F | OXYGEN SATURATION: 99 %

## 2024-01-20 DIAGNOSIS — F41.0 ANXIETY ATTACK: ICD-10-CM

## 2024-01-20 LAB
ALBUMIN SERPL BCG-MCNC: 4.6 G/DL (ref 3.5–5.2)
ALBUMIN UR-MCNC: NEGATIVE MG/DL
ALP SERPL-CCNC: 71 U/L (ref 40–150)
ALT SERPL W P-5'-P-CCNC: 12 U/L (ref 0–50)
AMPHETAMINES UR QL SCN: ABNORMAL
ANION GAP SERPL CALCULATED.3IONS-SCNC: 11 MMOL/L (ref 7–15)
APPEARANCE UR: CLEAR
AST SERPL W P-5'-P-CCNC: 16 U/L (ref 0–45)
BACTERIA #/AREA URNS HPF: ABNORMAL /HPF
BARBITURATES UR QL SCN: ABNORMAL
BASOPHILS # BLD AUTO: 0 10E3/UL (ref 0–0.2)
BASOPHILS NFR BLD AUTO: 0 %
BENZODIAZ UR QL SCN: ABNORMAL
BILIRUB SERPL-MCNC: 0.3 MG/DL
BILIRUB UR QL STRIP: NEGATIVE
BUN SERPL-MCNC: 13.9 MG/DL (ref 6–20)
BZE UR QL SCN: ABNORMAL
CALCIUM SERPL-MCNC: 9.2 MG/DL (ref 8.6–10)
CANNABINOIDS UR QL SCN: ABNORMAL
CHLORIDE SERPL-SCNC: 104 MMOL/L (ref 98–107)
COLOR UR AUTO: YELLOW
CREAT SERPL-MCNC: 0.86 MG/DL (ref 0.51–0.95)
DEPRECATED HCO3 PLAS-SCNC: 22 MMOL/L (ref 22–29)
EGFRCR SERPLBLD CKD-EPI 2021: 88 ML/MIN/1.73M2
EOSINOPHIL # BLD AUTO: 0 10E3/UL (ref 0–0.7)
EOSINOPHIL NFR BLD AUTO: 0 %
ERYTHROCYTE [DISTWIDTH] IN BLOOD BY AUTOMATED COUNT: 12 % (ref 10–15)
FENTANYL UR QL: ABNORMAL
GLUCOSE SERPL-MCNC: 117 MG/DL (ref 70–99)
GLUCOSE UR STRIP-MCNC: NEGATIVE MG/DL
HCT VFR BLD AUTO: 40.5 % (ref 35–47)
HGB BLD-MCNC: 13.9 G/DL (ref 11.7–15.7)
HGB UR QL STRIP: NEGATIVE
HOLD SPECIMEN: NORMAL
IMM GRANULOCYTES # BLD: 0 10E3/UL
IMM GRANULOCYTES NFR BLD: 0 %
KETONES UR STRIP-MCNC: NEGATIVE MG/DL
LEUKOCYTE ESTERASE UR QL STRIP: NEGATIVE
LYMPHOCYTES # BLD AUTO: 1.3 10E3/UL (ref 0.8–5.3)
LYMPHOCYTES NFR BLD AUTO: 15 %
MAGNESIUM SERPL-MCNC: 1.8 MG/DL (ref 1.7–2.3)
MCH RBC QN AUTO: 29.5 PG (ref 26.5–33)
MCHC RBC AUTO-ENTMCNC: 34.3 G/DL (ref 31.5–36.5)
MCV RBC AUTO: 86 FL (ref 78–100)
MONOCYTES # BLD AUTO: 0.4 10E3/UL (ref 0–1.3)
MONOCYTES NFR BLD AUTO: 5 %
NEUTROPHILS # BLD AUTO: 6.8 10E3/UL (ref 1.6–8.3)
NEUTROPHILS NFR BLD AUTO: 80 %
NITRATE UR QL: NEGATIVE
NRBC # BLD AUTO: 0 10E3/UL
NRBC BLD AUTO-RTO: 0 /100
OPIATES UR QL SCN: ABNORMAL
PCP QUAL URINE (ROCHE): ABNORMAL
PH UR STRIP: 5 [PH] (ref 5–7)
PLATELET # BLD AUTO: 275 10E3/UL (ref 150–450)
POTASSIUM SERPL-SCNC: 3.4 MMOL/L (ref 3.4–5.3)
PROT SERPL-MCNC: 7.1 G/DL (ref 6.4–8.3)
RBC # BLD AUTO: 4.71 10E6/UL (ref 3.8–5.2)
RBC URINE: <1 /HPF
SODIUM SERPL-SCNC: 137 MMOL/L (ref 135–145)
SP GR UR STRIP: 1.02 (ref 1–1.03)
SQUAMOUS EPITHELIAL: <1 /HPF
TROPONIN T SERPL HS-MCNC: <6 NG/L
TSH SERPL DL<=0.005 MIU/L-ACNC: 1.75 UIU/ML (ref 0.3–4.2)
UROBILINOGEN UR STRIP-MCNC: NORMAL MG/DL
WBC # BLD AUTO: 8.6 10E3/UL (ref 4–11)
WBC URINE: <1 /HPF

## 2024-01-20 PROCEDURE — 258N000003 HC RX IP 258 OP 636: Performed by: PHYSICIAN ASSISTANT

## 2024-01-20 PROCEDURE — 36415 COLL VENOUS BLD VENIPUNCTURE: CPT | Performed by: PHYSICIAN ASSISTANT

## 2024-01-20 PROCEDURE — 250N000011 HC RX IP 250 OP 636: Performed by: PHYSICIAN ASSISTANT

## 2024-01-20 PROCEDURE — 83735 ASSAY OF MAGNESIUM: CPT | Performed by: PHYSICIAN ASSISTANT

## 2024-01-20 PROCEDURE — 99284 EMERGENCY DEPT VISIT MOD MDM: CPT | Mod: 25 | Performed by: PHYSICIAN ASSISTANT

## 2024-01-20 PROCEDURE — 96361 HYDRATE IV INFUSION ADD-ON: CPT | Performed by: PHYSICIAN ASSISTANT

## 2024-01-20 PROCEDURE — 96374 THER/PROPH/DIAG INJ IV PUSH: CPT | Performed by: PHYSICIAN ASSISTANT

## 2024-01-20 PROCEDURE — 81001 URINALYSIS AUTO W/SCOPE: CPT | Performed by: PHYSICIAN ASSISTANT

## 2024-01-20 PROCEDURE — 80053 COMPREHEN METABOLIC PANEL: CPT | Performed by: PHYSICIAN ASSISTANT

## 2024-01-20 PROCEDURE — 84484 ASSAY OF TROPONIN QUANT: CPT | Performed by: PHYSICIAN ASSISTANT

## 2024-01-20 PROCEDURE — 85025 COMPLETE CBC W/AUTO DIFF WBC: CPT | Performed by: PHYSICIAN ASSISTANT

## 2024-01-20 PROCEDURE — 84443 ASSAY THYROID STIM HORMONE: CPT | Performed by: PHYSICIAN ASSISTANT

## 2024-01-20 PROCEDURE — 93005 ELECTROCARDIOGRAM TRACING: CPT | Performed by: PHYSICIAN ASSISTANT

## 2024-01-20 PROCEDURE — 80307 DRUG TEST PRSMV CHEM ANLYZR: CPT | Performed by: PHYSICIAN ASSISTANT

## 2024-01-20 PROCEDURE — 93010 ELECTROCARDIOGRAM REPORT: CPT | Performed by: PHYSICIAN ASSISTANT

## 2024-01-20 RX ORDER — LORAZEPAM 1 MG/1
1 TABLET ORAL EVERY 6 HOURS PRN
Qty: 5 TABLET | Refills: 0 | Status: SHIPPED | OUTPATIENT
Start: 2024-01-20 | End: 2024-01-30

## 2024-01-20 RX ORDER — LORAZEPAM 2 MG/ML
1 INJECTION INTRAMUSCULAR ONCE
Status: COMPLETED | OUTPATIENT
Start: 2024-01-20 | End: 2024-01-20

## 2024-01-20 RX ADMIN — LORAZEPAM 1 MG: 2 INJECTION INTRAMUSCULAR; INTRAVENOUS at 16:49

## 2024-01-20 RX ADMIN — SODIUM CHLORIDE 1000 ML: 9 INJECTION, SOLUTION INTRAVENOUS at 16:45

## 2024-01-20 ASSESSMENT — ACTIVITIES OF DAILY LIVING (ADL): ADLS_ACUITY_SCORE: 35

## 2024-01-20 NOTE — ED TRIAGE NOTES
Patient took a gummie and is stating feeling funny. Patient states she is feeling like something is wrong.

## 2024-01-20 NOTE — ED PROVIDER NOTES
"  History     Chief Complaint   Patient presents with    Anxiety     HPI  Amy Moore is a 38 year old female who presents with her  for evaluation of symptoms starting within the past 1.5 hours.  She reports that they were going out to a social engagement and because of underlying social anxiety, she does take THC Gummies off and on.  Apparently she takes the same, maybe up to 2-3 times per week and has never had troubles with it.  He generally just calm her.  She continues to get her Gummies from the same supplier.  Nothing was different about this coming.  She did not receive it from anybody else.  She had a few sips of a white Russian when they are at the social engagement and then shortly after that she started to feel anxious with chest discomfort and feeling of the mouth was swelling.  She felt like she was going to pass out and felt very lightheaded.  She had to sit down.  She continues to feel anxious.  She states \"I feel like I am going to can lose control my body \".  Reports that she has been very stressed recently.  For the past 2 months she has had off-and-on episodes like this but generally they have always improved and have never been this severe.  During these episodes, she has had some blurry vision off and on 2 different times.  No blurry vision in between episodes.  No diplopia.  No recent fall or head injury.  She denies a current headache.  Denies any recent fever, chills, rhinorrhea, congestion, sore throat, dyspnea, cough, or palpitations.  She does note that when she drinks caffeine recently she just does not feel well.  Has not had any recent change in medications.  She denies any homicidal or suicidal ideation.  She denies any street drug use.  No regular alcohol use.  No chance of withdrawal.        Allergies:  No Known Allergies    Problem List:    Patient Active Problem List    Diagnosis Date Noted    ASCUS of cervix with negative high risk HPV 12/13/2023     Priority: Medium "     16 NIL pap, neg HPV  18 NIL pap, neg HPV  23 ASCUS, neg HPV. Plan: cotest in 3 years  23 Result sent via  [facility]. Pt read results      Chronic midline low back pain without sciatica 10/06/2022     Priority: Medium    Bleeding hemorrhoids 10/06/2022     Priority: Medium    Menorrhagia with regular cycle 10/06/2022     Priority: Medium    Class 1 obesity due to excess calories without serious comorbidity with body mass index (BMI) of 33.0 to 33.9 in adult 2021     Priority: Medium    Carpal tunnel syndrome on right 2020     Priority: Medium    High-risk pregnancy 10/30/2019     Priority: Medium    Internal hemorrhoids without complication 2019     Priority: Medium    LORI (generalized anxiety disorder) 2017     Priority: Medium    S/P  section 2016     Priority: Medium    Slow transit constipation 2016     Priority: Medium    Weight gain 2016     Priority: Medium    Pelvic pain in female 2016     Priority: Medium        Past Medical History:    Past Medical History:   Diagnosis Date    High-risk pregnancy in third trimester 2020    Pelvic pain in female 2016    Slow transit constipation 2016    Weight gain 2016       Past Surgical History:    Past Surgical History:   Procedure Laterality Date     SECTION N/A 2016    Procedure:  SECTION;  Surgeon: Merrick Justice MD;  Location: [facility]     SECTION N/A 2020    Procedure:  SECTION;  Surgeon: Mello Nguyễn MD;  Location: [facility]       Family History:    Family History   Problem Relation Age of Onset    Hypothyroidism Maternal Grandmother     Thyroid Disease Maternal Grandmother     No Known Problems Maternal Grandfather     No Known Problems Paternal Grandmother     Myocardial Infarction Paternal Grandfather 40    Hypothyroidism Mother     Hypertension Mother     Thyroid Disease Mother     No Known Problems Father      Hypothyroidism Sister     Thyroid Disease Sister     Hypothyroidism Sister     Thyroid Disease Sister     No Known Problems Son        Social History:  Marital Status:   [2]  Social History     Tobacco Use    Smoking status: Former     Packs/day: 0.00     Years: 1.00     Additional pack years: 0.00     Total pack years: 0.00     Types: Cigarettes     Start date: 2004     Quit date: 2005     Years since quittin.0    Smokeless tobacco: Never    Tobacco comments:     socially in early 's   Vaping Use    Vaping Use: Never used   Substance Use Topics    Alcohol use: Yes     Comment: socially    Drug use: No        Medications:    LORazepam (ATIVAN) 1 MG tablet          Review of Systems   All other systems reviewed and are negative.      Physical Exam   BP: (!) 157/77  Pulse: (!) 121  Temp: 98  F (36.7  C)  Resp: 20  SpO2: 100 %      Physical Exam  Vitals and nursing note reviewed.   Constitutional:       General: She is not in acute distress.     Appearance: She is not ill-appearing or diaphoretic.      Comments: Appears very anxious.  Tearful.   HENT:      Head: Normocephalic and atraumatic.      Right Ear: Tympanic membrane, ear canal and external ear normal.      Left Ear: Tympanic membrane, ear canal and external ear normal.      Nose: Nose normal. No congestion or rhinorrhea.      Mouth/Throat:      Mouth: Mucous membranes are moist.      Pharynx: No oropharyngeal exudate or posterior oropharyngeal erythema.   Eyes:      General: No scleral icterus.        Right eye: No discharge.         Left eye: No discharge.      Conjunctiva/sclera: Conjunctivae normal.      Pupils: Pupils are equal, round, and reactive to light.   Neck:      Thyroid: No thyromegaly.      Comments: No thyromegaly.  No thyroid nodules.  Cardiovascular:      Rate and Rhythm: Normal rate and regular rhythm.      Heart sounds: Normal heart sounds. No murmur heard.  Pulmonary:      Effort: Pulmonary effort is normal. No  respiratory distress.      Breath sounds: Normal breath sounds. No wheezing or rales.   Chest:      Chest wall: No tenderness.   Abdominal:      General: Bowel sounds are normal. There is no distension.      Palpations: Abdomen is soft. There is no mass.      Tenderness: There is no abdominal tenderness. There is no right CVA tenderness, left CVA tenderness, guarding or rebound.   Musculoskeletal:         General: No tenderness or deformity. Normal range of motion.      Cervical back: Normal range of motion and neck supple. No rigidity or tenderness.   Lymphadenopathy:      Cervical: No cervical adenopathy.   Skin:     General: Skin is warm and dry.      Capillary Refill: Capillary refill takes less than 2 seconds.      Coloration: Skin is not jaundiced or pale.      Findings: No bruising, erythema, lesion or rash.   Neurological:      Mental Status: She is alert and oriented to person, place, and time.      Cranial Nerves: No cranial nerve deficit.      Comments: Neuro:  Cranial nerves II-XII grossly intact.  Romberg is steady.  Gait WNL's.  Cerebellar testing is WNL's.  Sensation is intact to light touch throughout.  Bicep, brachioradialis, patellar, and achilles DTR's 2/4 without clonus.  No neurological abnormality identified.     Psychiatric:         Attention and Perception: Attention and perception normal.         Mood and Affect: Mood is anxious. Affect is tearful.         Speech: Speech normal.         Behavior: Behavior normal.         Thought Content: Thought content normal. Thought content does not include homicidal or suicidal ideation. Thought content does not include homicidal or suicidal plan.         Cognition and Memory: Cognition and memory normal.         Judgment: Judgment normal.         ED Course                 Procedures              EKG Interpretation:      Interpreted by René Aparicio PA-C  Symptoms at time of EKG: chest pressure   Rhythm: normal sinus   Rate: normal  Axis:  normal  Ectopy: none  Conduction: normal  ST Segments/ T Waves: No ST-T wave changes  Q Waves: none  Comparison to prior: No old EKG available    Clinical Impression: normal EKG      Critical Care time:  none               Results for orders placed or performed during the hospital encounter of 01/20/24 (from the past 24 hour(s))   Extra Tube    Narrative    The following orders were created for panel order Extra Tube.  Procedure                               Abnormality         Status                     ---------                               -----------         ------                     Extra Blue Top Tube[[id number]]                              Final result                 Please view results for these tests on the individual orders.   Extra Blue Top Tube   Result Value Ref Range    Hold Specimen JI    CBC with platelets differential    Narrative    The following orders were created for panel order CBC with platelets differential.  Procedure                               Abnormality         Status                     ---------                               -----------         ------                     CBC with platelets and d...[[id number]]                      Final result                 Please view results for these tests on the individual orders.   Comprehensive metabolic panel   Result Value Ref Range    Sodium 137 135 - 145 mmol/L    Potassium 3.4 3.4 - 5.3 mmol/L    Carbon Dioxide (CO2) 22 22 - 29 mmol/L    Anion Gap 11 7 - 15 mmol/L    Urea Nitrogen 13.9 6.0 - 20.0 mg/dL    Creatinine 0.86 0.51 - 0.95 mg/dL    GFR Estimate 88 >60 mL/min/1.73m2    Calcium 9.2 8.6 - 10.0 mg/dL    Chloride 104 98 - 107 mmol/L    Glucose 117 (H) 70 - 99 mg/dL    Alkaline Phosphatase 71 40 - 150 U/L    AST 16 0 - 45 U/L    ALT 12 0 - 50 U/L    Protein Total 7.1 6.4 - 8.3 g/dL    Albumin 4.6 3.5 - 5.2 g/dL    Bilirubin Total 0.3 <=1.2 mg/dL   Troponin T, High Sensitivity   Result Value Ref Range    Troponin T, High  Sensitivity <6 <=14 ng/L   TSH with free T4 reflex   Result Value Ref Range    TSH 1.75 0.30 - 4.20 uIU/mL   Magnesium   Result Value Ref Range    Magnesium 1.8 1.7 - 2.3 mg/dL   CBC with platelets and differential   Result Value Ref Range    WBC Count 8.6 4.0 - 11.0 10e3/uL    RBC Count 4.71 3.80 - 5.20 10e6/uL    Hemoglobin 13.9 11.7 - 15.7 g/dL    Hematocrit 40.5 35.0 - 47.0 %    MCV 86 78 - 100 fL    MCH 29.5 26.5 - 33.0 pg    MCHC 34.3 31.5 - 36.5 g/dL    RDW 12.0 10.0 - 15.0 %    Platelet Count 275 150 - 450 10e3/uL    % Neutrophils 80 %    % Lymphocytes 15 %    % Monocytes 5 %    % Eosinophils 0 %    % Basophils 0 %    % Immature Granulocytes 0 %    NRBCs per 100 WBC 0 <1 /100    Absolute Neutrophils 6.8 1.6 - 8.3 10e3/uL    Absolute Lymphocytes 1.3 0.8 - 5.3 10e3/uL    Absolute Monocytes 0.4 0.0 - 1.3 10e3/uL    Absolute Eosinophils 0.0 0.0 - 0.7 10e3/uL    Absolute Basophils 0.0 0.0 - 0.2 10e3/uL    Absolute Immature Granulocytes 0.0 <=0.4 10e3/uL    Absolute NRBCs 0.0 10e3/uL   UA with Microscopic reflex to Culture    Specimen: Urine, Clean Catch   Result Value Ref Range    Color Urine Yellow Colorless, Straw, Light Yellow, Yellow    Appearance Urine Clear Clear    Glucose Urine Negative Negative mg/dL    Bilirubin Urine Negative Negative    Ketones Urine Negative Negative mg/dL    Specific Gravity Urine 1.021 1.003 - 1.035    Blood Urine Negative Negative    pH Urine 5.0 5.0 - 7.0    Protein Albumin Urine Negative Negative mg/dL    Urobilinogen Urine Normal Normal, 2.0 mg/dL    Nitrite Urine Negative Negative    Leukocyte Esterase Urine Negative Negative    Bacteria Urine Few (A) None Seen /HPF    RBC Urine <1 <=2 /HPF    WBC Urine <1 <=5 /HPF    Squamous Epithelials Urine <1 <=1 /HPF    Narrative    Urine Culture not indicated   Urine Drug Screen    Narrative    The following orders were created for panel order Urine Drug Screen.  Procedure                               Abnormality         Status                      ---------                               -----------         ------                     Urine Drug Screen Panel[[id number]]      Abnormal            Final result                 Please view results for these tests on the individual orders.   Urine Drug Screen Panel   Result Value Ref Range    Amphetamines Urine Screen Negative Screen Negative    Barbituates Urine Screen Negative Screen Negative    Benzodiazepine Urine Screen Negative Screen Negative    Cannabinoids Urine Screen Positive (A) Screen Negative    Cocaine Urine Screen Negative Screen Negative    Fentanyl Qual Urine Screen Negative Screen Negative    Opiates Urine Screen Negative Screen Negative    PCP Urine Screen Negative Screen Negative       Medications   sodium chloride 0.9% BOLUS 1,000 mL (has no administration in time range)   LORazepam (ATIVAN) injection 1 mg (1 mg Intravenous $Given 1/20/24 5707)       Assessments & Plan (with Medical Decision Making)  Anxiety attack     38 year old female presents with her  for evaluation of symptoms starting within the last couple hours abruptly at a social engagement.  She took a THC gummy prior to going and then had a couple sips of a white Russian while she was there.  Suddenly started developing chest pressure, the sensation of mild swelling, lightheadedness, and feeling like she was going to pass out or lose control of her body.  See HPI above for details.  Exam with blood pressure 157/77, temperature 98.0, pulse 121, respiration 20, oxygen saturation 100% on room air.  Patient appears very anxious.  Tearful.  ENT exam is negative.  No thyromegaly.  No thyroid nodules.  Oropharynx is negative.  Heart with tachycardia.  No murmur.  No adventitious lung sounds throughout with pulmonary exam.  Abdomen soft and nontender.  No lower extremity edema.  No calf tenderness.  Negative Homans' sign.  EKG interpreted by myself.  No acute ST or T wave elevation.  No arrhythmia.  IV was established.   Given 1 L IV normal saline to ensure adequate hydration especially with her tachycardia.  She is given IV Ativan for symptom management and noted significant improvement.  Laboratory levels display no acute abnormality with comprehensive metabolic panel, troponin, TSH, magnesium, CBC, and urinalysis.  Urine tox positive for cannabinoids which was expected, but thankfully there is no other substances that were potentially tainting the edible gummy.  I had a long discussion with the patient about her reassuring workup.  We did not find any emergent condition.  The Ativan resolved her symptoms and she was feeling much better.  She agrees that she is not homicidal or suicidal.  We had a long conversation about trying to work on stress in her life.  We discussed simplifying life.  Encouraged her to follow-up with her primary in the next couple weeks.  Consider counseling again.  ED return instructions reviewed.  I did give her a small prescription for Ativan to use at home as a rescue in the future.  She is not sure if she wanted a prescription, but I encouraged her to at least take the paper prescription just in case.  Patient was in agreement.  Her  was present to drive her home.     I have reviewed the nursing notes.    I have reviewed the findings, diagnosis, plan and need for follow up with the patient.           Medical Decision Making  The patient's presentation was of moderate complexity (an acute illness with systemic symptoms).    The patient's evaluation involved:  ordering and/or review of 3+ test(s) in this encounter (see separate area of note for details)    The patient's management necessitated high risk (drug therapy requiring intensive monitoring (see separate area of note for details)).        New Prescriptions    LORAZEPAM (ATIVAN) 1 MG TABLET    Take 1 tablet (1 mg) by mouth every 6 hours as needed for anxiety       Final diagnoses:   Anxiety attack     Disclaimer: This note consists of symbols  derived from keyboarding, dictation and/or voice recognition software. As a result, there may be errors in the script that have gone undetected. Please consider this when interpreting information found in this chart.      1/20/2024   [facility] EMERGENCY DEPT       René Aparicio PA-C  01/20/24 8544

## 2024-01-21 NOTE — ED NOTES
Pt talked about anxiety's with work home life/kids etc. Pt given emotional support. Pt very forthcoming in how she feels.

## 2024-01-21 NOTE — DISCHARGE INSTRUCTIONS
It was a pleasure working with you today!  I am thankful that you are feeling much better this evening.  Try and get some good rest tonight and keep things simple the remainder of the night.  It is okay to use the prescription Ativan for any severe breakthrough symptoms again in the future.  Follow-up with your primary care doctor for recheck in the next couple weeks.  Think about the things that we discussed today, and hopefully one of those ideas will be helpful :)

## 2024-01-23 ENCOUNTER — MYC MEDICAL ADVICE (OUTPATIENT)
Dept: FAMILY MEDICINE | Facility: CLINIC | Age: 39
End: 2024-01-23
Payer: COMMERCIAL

## 2024-01-23 DIAGNOSIS — F41.1 GAD (GENERALIZED ANXIETY DISORDER): Primary | ICD-10-CM

## 2024-01-26 ENCOUNTER — TELEPHONE (OUTPATIENT)
Dept: BEHAVIORAL HEALTH | Facility: CLINIC | Age: 39
End: 2024-01-26
Payer: COMMERCIAL

## 2024-01-26 RX ORDER — BUSPIRONE HYDROCHLORIDE 5 MG/1
TABLET ORAL
Qty: 180 TABLET | Refills: 3 | Status: SHIPPED | OUTPATIENT
Start: 2024-01-26 | End: 2024-03-28

## 2024-01-26 NOTE — TELEPHONE ENCOUNTER
Phone Encounter  [facility] made attempt to reach patient, by PCP request. LM requesting a returned call and provided call back number. Informed that writer will also send a [employer] message that she could respond to.    JAYDEN Day, Behavioral Health Clinician

## 2024-01-30 ENCOUNTER — NURSE TRIAGE (OUTPATIENT)
Dept: FAMILY MEDICINE | Facility: CLINIC | Age: 39
End: 2024-01-30
Payer: COMMERCIAL

## 2024-01-30 DIAGNOSIS — F41.0 PANIC ATTACK: Primary | ICD-10-CM

## 2024-01-30 RX ORDER — LORAZEPAM 1 MG/1
0.5 TABLET ORAL EVERY 8 HOURS PRN
Qty: 10 TABLET | Refills: 0 | Status: SHIPPED | OUTPATIENT
Start: 2024-01-30 | End: 2024-02-08
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
## 2024-01-30 NOTE — TELEPHONE ENCOUNTER
Attempted to call patient to triage. Unable to leave message. Started a triage call if patient calls back on a separate encounter.    JOHANNA ManjarrezN, RN

## 2024-01-30 NOTE — TELEPHONE ENCOUNTER
Patient called back and updated her on provider's advice. Patient is fine with the Ativan for now and will take her buspar per providers advice. Let patient know prescription is at [facility]'s in [place].    Sandee Payan RN on 1/30/2024 at 4:45 PM

## 2024-01-30 NOTE — TELEPHONE ENCOUNTER
Nurse Triage SBAR    Is this a 2nd Level Triage? YES, LICENSED PRACTITIONER REVIEW IS REQUIRED    Situation: Patient called in with concerns about her anxiety and new anxiety medication Buspar, wondering if she is having side effects.     Background: Patient states she has had anxiety since her daughter was born 4 years ago. She states the weekend before last she ended up going to the ED for anxiety/panic attack symptoms. She states she recently started on Buspar on Sunday evening.     Assessment: Patient state she has been continuing to have her symptoms of anxiety that she has discussed with her PCP, but she is unsure if it is the anxiety or if she is now having medication side effects. She states that sometimes she feels good, then suddenly she does not. She states that her symptoms seem to be worse the last couple days about 30 minutes after taking her Buspar. She states her symptoms are chest pressure, feeling uneasy, her eyes get weird, a weird feeling in her chest, her head feels uneasy, lightheaded, dizziness, upset stomach, diarrhea. She states at times she feels like her throat is closing and that everything is closing in on her. She states after she took her medication this morning at 7 AM she felt like she was going to pass out. She states she has been crying because it makes her sad that her  has to deal with her episodes of these symptoms. She denies any feelings or thoughts of harming herself or others. Patient states she feels ok right now.     Protocol Recommended Disposition:   See in Office Today    Recommendation: Per protocol patient should be seen in office today. No appointments available, advised patient to go to urgent care or ED. Patient states she feels ok right now and doesn't want to go to ED. She would like a message sent to her provider to see if this could be a medication side effect or what she should do next. Advised patient I would get a message to provider, and that she  should call back or seek urgent or emergency care for any new or worsening symptoms. Patient verbalized understanding and had no further questions.      Routed to provider    Rosi Felipe, JOHANNAN, RN

## 2024-01-30 NOTE — TELEPHONE ENCOUNTER
Huddled with PCP.     PCP stated it sounds like she is describing panic attack symptoms. PCP states he will send in a low dose of Ativan for patient to use when she is feeling this way.    PCP states any new medication can give some unwanted side effects, and that she should give herself 2 weeks on the new medication to see if it is helping with the anxiety.     Called patient to give this advice from PCP.     Patient states that the pharmacist told her not to take the Buspar when she has Ativan in her system. She wants to confirm if it is ok for her to take these together?      Rosi Felipe, JOHANNAN, RN

## 2024-01-30 NOTE — TELEPHONE ENCOUNTER
I sent a prescription for the Ativan tablets but I want her to take half a tablet when she is feeling like she is having a panic attack.  This should relax her enough without making her too groggy.  She really needs to try to stick with the BuSpar for a minimum of 2 weeks so that her body can adjust to it.  The other medication we could try with her if she wants is hydroxyzine which will help settle her brain down so she does not get so worked up.  This is a 10 mg tablet every 6 hours as needed.    Electronically signed by:  Mello Nguyễn M.D.  1/30/2024

## 2024-01-30 NOTE — TELEPHONE ENCOUNTER
"Reason for Disposition    Patient sounds very upset or troubled to the triager    Additional Information    Negative: SEVERE difficulty breathing (e.g., struggling for each breath, speaks in single words)    Negative: Bluish (or gray) lips or face    Negative: Difficult to awaken or acting confused (e.g., disoriented, slurred speech)    Negative: Hysterical or combative behavior    Negative: Sounds like a life-threatening emergency to the triager    Negative: Chest pain    Negative: Palpitations, skipped heartbeat, or rapid heartbeat is main symptom    Negative: Cough is main symptom    Negative: Suicide thoughts, threats, attempts, or questions    Negative: Depression is main problem or symptom (e.g., feelings of sadness or hopelessness)    Negative: Difficulty breathing and persists > 10 minutes and not relieved by reassurance provided by triager    Negative: Lightheadedness or dizziness and persists > 10 minutes and not relieved by reassurance provided by triager    Negative: SEVERE anxiety (e.g., extremely anxious with intense emotional symptoms such as feeling of unreality, urge to flee, unable to calm down; unable to cope or function), which is not better after 10 minutes of reassurance and Care Advice    Negative: Panic attack symptoms (e.g., sudden onset of intense fear and symptoms such as dizziness, feeling of impending doom or fear of dying, hyperventilation, numbness or tingling, sweating, trembling), and has not been evaluated for this by doctor (or NP/PA)    Negative: Panic attack symptoms (diagnosed in the past) that is not better with usual treatment, reassurance, or Care Advice    Negative: Alcohol or drug use, known or suspected, and feeling very shaky (i.e., visible tremors of hands)    Negative: Patient sounds very sick or weak to the triager    Answer Assessment - Initial Assessment Questions  1. CONCERN: \"Did anything happen that prompted you to call today?\"       No, just the symptoms  2. " "ANXIETY SYMPTOMS: \"Can you describe how you (your loved one; patient) have been feeling?\" (e.g., tense, restless, panicky, anxious, keyed up, overwhelmed, sense of impending doom).       \"Like everything is closing in on me\" \"Like I have no control, like my whole body is shutting down, then I get panicky\" \"My throat feels like it's closing up\" Sometimes feeling overwhelmed  3. ONSET: \"How long have you been feeling this way?\" (e.g., hours, days, weeks)      Started the weekend before last at a [employer] party   4. SEVERITY: \"How would you rate the level of anxiety?\" (e.g., 0 - 10; or mild, moderate, severe).      8/10  5. FUNCTIONAL IMPAIRMENT: \"How have these feelings affected your ability to do daily activities?\" \"Have you had more difficulty than usual doing your normal daily activities?\" (e.g., getting better, same, worse; self-care, school, work, interactions)      Yes, she took work off yesterday  6. HISTORY: \"Have you felt this way before?\" \"Have you ever been diagnosed with an anxiety problem in the past?\" (e.g., generalized anxiety disorder, panic attacks, PTSD). If Yes, ask: \"How was this problem treated?\" (e.g., medicines, counseling, etc.)      Yes, has been diagnosed with anxiety, has gone to therapy and newly starting anxiety medication  7. RISK OF HARM - SUICIDAL IDEATION: \"Do you ever have thoughts of hurting or killing yourself?\" If Yes, ask:  \"Do you have these feelings now?\" \"Do you have a plan on how you would do this?\"      No  8. TREATMENT:  \"What has been done so far to treat this anxiety?\" (e.g., medicines, relaxation strategies). \"What has helped?\"      Recently started anxiety medication, positive affirmation, breathing techniques, holding ice, therapy, working out   9. TREATMENT - THERAPIST: \"Do you have a counselor or therapist? Name?\"      Has previously talked to Ruth  10. POTENTIAL TRIGGERS: \"Do you drink caffeinated beverages (e.g., coffee, gonzalez, teas), and how much daily?\" \"Do you " "drink alcohol or use any drugs?\" \"Have you started any new medicines recently?\"        She reports she stopped drinking caffeine recently. No alcohol, was taking THC gummies but recently stopped doing that as well.   11. PATIENT SUPPORT: \"Who is with you now?\" \"Who do you live with?\" \"Do you have family or friends who you can talk to?\"         Alone in car now. Live with  and kids at home. Talks to , best friend and parents.   12. OTHER SYMPTOMS: \"Do you have any other symptoms?\" (e.g., feeling depressed, trouble concentrating, trouble sleeping, trouble breathing, palpitations or fast heartbeat, chest pain, sweating, nausea, or diarrhea)        Dizziness, diarrhea, lightheadedness, chest pain, increased anxiety, felt like her heart was racing but when checking pulse it is normal, \"closing in on you\" upset stomach  13. PREGNANCY: \"Is there any chance you are pregnant?\" \"When was your last menstrual period?\"        No, LMP about 3 weeks ago    Protocols used: Anxiety and Panic Attack-A-[place]    "

## 2024-01-30 NOTE — TELEPHONE ENCOUNTER
RN TRIAGE CALL:    Patient Contact    Attempt # 1    Was call answered?  No.  Left message on voicemail with information to call me back.      If patient returns call please relay the following information from PCP that he added to his note after writer huddled with him and called patient back:         I sent a prescription for the Ativan tablets but I want her to take half a tablet when she is feeling like she is having a panic attack.  This should relax her enough without making her too groggy.  She really needs to try to stick with the BuSpar for a minimum of 2 weeks so that her body can adjust to it.  The other medication we could try with her if she wants is hydroxyzine which will help settle her brain down so she does not get so worked up.  This is a 10 mg tablet every 6 hours as needed.     Electronically signed by:  Mello Nguyễn M.D.  1/30/2024

## 2024-02-06 ENCOUNTER — TELEPHONE (OUTPATIENT)
Dept: FAMILY MEDICINE | Facility: CLINIC | Age: 39
End: 2024-02-06
Payer: COMMERCIAL

## 2024-02-06 NOTE — TELEPHONE ENCOUNTER
"Patient called in following up after starting a new medication Buspar and recent triage call about possible side effects, continued anxiety, panic attack like symptoms last week. See triage 1/30/24.    Patient states she tried a half of an Ativan one night last week around 8 PM. She states she went to bed fine but then woke up around 1 AM and felt like her eyes were shaking and her heart was racing.     She states she has not felt panic attack symptoms in a few days but that she has continued to have side effects from the Buspar and she isn't sleeping well due to them. She states she takes the Buspar 2 times daily, and that she always seems to feel the best when it's about 2 hours before her next dose of Buspar is due. Then she takes the medication and feels side effects. She states the side effects she gets are headaches, her chest hurts, heart racing, not feeling well. She states these symptoms keep her up at night. She states laying down makes her heart race.     She states today she was at work and was feeling fine, was working with a patient then suddenly started to feel like things were closing in on her. She states she felt like passing out. She states her coworker said she looked pale. She states she took her blood pressure and it was 149/90 with . She states she sat, ate something, drank some fluids. She states her BP went down, then up again, then was ok.     She states these symptoms have improved now but she had to have her  pick her up from work due to her symptoms, and she just feels \"sick.\" She states her heart is not racing but that she still feels her heart pounding.    Patient states she has an appointment on Thursday with Dr. Justice but she does not know if she should wait that long to be seen.     Advised patient I will route a message to PCP, and advised patient to go to ED for any new or worsening symptoms.     Rosi Felipe, JOHANNAN, RN    "

## 2024-02-07 ASSESSMENT — PATIENT HEALTH QUESTIONNAIRE - PHQ9
10. IF YOU CHECKED OFF ANY PROBLEMS, HOW DIFFICULT HAVE THESE PROBLEMS MADE IT FOR YOU TO DO YOUR WORK, TAKE CARE OF THINGS AT HOME, OR GET ALONG WITH OTHER PEOPLE: EXTREMELY DIFFICULT
SUM OF ALL RESPONSES TO PHQ QUESTIONS 1-9: 24
SUM OF ALL RESPONSES TO PHQ QUESTIONS 1-9: 24

## 2024-02-07 ASSESSMENT — ANXIETY QUESTIONNAIRES
7. FEELING AFRAID AS IF SOMETHING AWFUL MIGHT HAPPEN: NEARLY EVERY DAY
GAD7 TOTAL SCORE: 21
IF YOU CHECKED OFF ANY PROBLEMS ON THIS QUESTIONNAIRE, HOW DIFFICULT HAVE THESE PROBLEMS MADE IT FOR YOU TO DO YOUR WORK, TAKE CARE OF THINGS AT HOME, OR GET ALONG WITH OTHER PEOPLE: EXTREMELY DIFFICULT
4. TROUBLE RELAXING: NEARLY EVERY DAY
8. IF YOU CHECKED OFF ANY PROBLEMS, HOW DIFFICULT HAVE THESE MADE IT FOR YOU TO DO YOUR WORK, TAKE CARE OF THINGS AT HOME, OR GET ALONG WITH OTHER PEOPLE?: EXTREMELY DIFFICULT
7. FEELING AFRAID AS IF SOMETHING AWFUL MIGHT HAPPEN: NEARLY EVERY DAY
1. FEELING NERVOUS, ANXIOUS, OR ON EDGE: NEARLY EVERY DAY
GAD7 TOTAL SCORE: 21
6. BECOMING EASILY ANNOYED OR IRRITABLE: NEARLY EVERY DAY
2. NOT BEING ABLE TO STOP OR CONTROL WORRYING: NEARLY EVERY DAY
5. BEING SO RESTLESS THAT IT IS HARD TO SIT STILL: NEARLY EVERY DAY
3. WORRYING TOO MUCH ABOUT DIFFERENT THINGS: NEARLY EVERY DAY

## 2024-02-07 NOTE — TELEPHONE ENCOUNTER
Called patient and relayed the below response from PCP below.     Patient verbalized understanding and had no further questions. She states she will discuss with Dr. Justice at her appointment tomorrow.     Rosi Felipe BSN, RN

## 2024-02-07 NOTE — TELEPHONE ENCOUNTER
She is describing a panic attack.  I am uncertain as to what else to do for her. I am not certain if she has met with the therapist yet, but I know that is scheduled too.  Any medication she tries, she has some sort of symptom all of which sound like an anxiety attack. If her symptoms are worsening she will need to go into the ED, I don't have any time available prior to her appointment.     Electronically signed by:  Mello Nguyễn M.D.  2/6/2024

## 2024-02-08 ENCOUNTER — MYC MEDICAL ADVICE (OUTPATIENT)
Dept: FAMILY MEDICINE | Facility: CLINIC | Age: 39
End: 2024-02-08

## 2024-02-08 ENCOUNTER — OFFICE VISIT (OUTPATIENT)
Dept: FAMILY MEDICINE | Facility: CLINIC | Age: 39
End: 2024-02-08
Payer: COMMERCIAL

## 2024-02-08 VITALS
BODY MASS INDEX: 32.08 KG/M2 | RESPIRATION RATE: 18 BRPM | SYSTOLIC BLOOD PRESSURE: 122 MMHG | DIASTOLIC BLOOD PRESSURE: 66 MMHG | WEIGHT: 202 LBS | TEMPERATURE: 97.7 F | HEART RATE: 84 BPM | OXYGEN SATURATION: 100 %

## 2024-02-08 DIAGNOSIS — F41.0 PANIC ATTACK: ICD-10-CM

## 2024-02-08 PROCEDURE — 99214 OFFICE O/P EST MOD 30 MIN: CPT | Performed by: FAMILY MEDICINE

## 2024-02-08 RX ORDER — LORAZEPAM 1 MG/1
0.5 TABLET ORAL EVERY 8 HOURS PRN
Qty: 60 TABLET | Refills: 0 | Status: SHIPPED | OUTPATIENT
Start: 2024-02-08

## 2024-02-08 RX ORDER — ESCITALOPRAM OXALATE 10 MG/1
TABLET ORAL
Qty: 60 TABLET | Refills: 1 | Status: SHIPPED | OUTPATIENT
Start: 2024-02-08 | End: 2024-03-28

## 2024-02-08 ASSESSMENT — ANXIETY QUESTIONNAIRES
7. FEELING AFRAID AS IF SOMETHING AWFUL MIGHT HAPPEN: NEARLY EVERY DAY
1. FEELING NERVOUS, ANXIOUS, OR ON EDGE: NEARLY EVERY DAY
IF YOU CHECKED OFF ANY PROBLEMS ON THIS QUESTIONNAIRE, HOW DIFFICULT HAVE THESE PROBLEMS MADE IT FOR YOU TO DO YOUR WORK, TAKE CARE OF THINGS AT HOME, OR GET ALONG WITH OTHER PEOPLE: EXTREMELY DIFFICULT
6. BECOMING EASILY ANNOYED OR IRRITABLE: NEARLY EVERY DAY
5. BEING SO RESTLESS THAT IT IS HARD TO SIT STILL: NOT AT ALL
2. NOT BEING ABLE TO STOP OR CONTROL WORRYING: NEARLY EVERY DAY
GAD7 TOTAL SCORE: 18
3. WORRYING TOO MUCH ABOUT DIFFERENT THINGS: NEARLY EVERY DAY

## 2024-02-08 ASSESSMENT — ENCOUNTER SYMPTOMS: NERVOUS/ANXIOUS: 1

## 2024-02-08 ASSESSMENT — PATIENT HEALTH QUESTIONNAIRE - PHQ9: 5. POOR APPETITE OR OVEREATING: NEARLY EVERY DAY

## 2024-02-08 NOTE — PROGRESS NOTES
Nicolas Reyes is a 38 year old, presenting for the following health issues:  Anxiety      2024     9:24 AM   Additional Questions   Roomed by Kathy Nagy       I had a long discussion today with Juhi and her  about her anxiety and during her description of the problems she has been having over the past few weeks it appears that she has classic panic attacks.  She gets herself so worked up she cannot even allow her mind to think straight.  Her  was here supporting her and adding to the history when he could.  We did talk about her psychosocial situation which is excellent.  We talked about panic attacks we talked about generalized anxiety disorder and the pathophysiology of these problems.  We also talked about counseling and medications for these disorders.  She is willing to go to counseling again with her like to try different counseling service if possible and we can set that up for her.  Did talk to her about behavioral conditioning and deescalating which which would be one of the mechanisms she will need to use in the future.  Overall her  is supportive and helping her through this.  Her family is also supportive.        Anxiety Follow-Up  How are you doing with your anxiety since your last visit? Worsened panic attack symptoms  Are you having other symptoms that might be associated with anxiety? Yes:  all the anxiety symptoms  Have you had a significant life event? No   Are you feeling depressed? Yes:  starting to due to not beeing able to function with out painic  Do you have any concerns with your use of alcohol or other drugs? No    Social History     Tobacco Use    Smoking status: Former     Packs/day: 0.00     Years: 1.00     Additional pack years: 0.00     Total pack years: 0.00     Types: Cigarettes     Start date: 2004     Quit date: 2005     Years since quittin.1    Smokeless tobacco: Never    Tobacco comments:     socially in early  20's   Vaping Use    Vaping Use: Never used   Substance Use Topics    Alcohol use: Yes     Comment: socially    Drug use: No         5/13/2021     3:03 PM 10/25/2022     3:33 PM 2/7/2024     2:12 PM   LORI-7 SCORE   Total Score  11 (moderate anxiety) 21 (severe anxiety)   Total Score 12 11 21         9/7/2017     3:20 PM 10/25/2022     3:32 PM 2/7/2024     2:14 PM   PHQ   PHQ-9 Total Score 6 6 24   Q9: Thoughts of better off dead/self-harm past 2 weeks Not at all Not at all Not at all         2/8/2024     9:33 AM   Last PHQ-9   1.  Little interest or pleasure in doing things 3   2.  Feeling down, depressed, or hopeless 3   3.  Trouble falling or staying asleep, or sleeping too much 3   4.  Feeling tired or having little energy 3   5.  Poor appetite or overeating 3   6.  Feeling bad about yourself 3   7.  Trouble concentrating 3   8.  Moving slowly or restless 0   Q9: Thoughts of better off dead/self-harm past 2 weeks 0   PHQ-9 Total Score 21         2/8/2024     9:33 AM   LORI-7    1. Feeling nervous, anxious, or on edge 3   2. Not being able to stop or control worrying 3   3. Worrying too much about different things 3   4. Trouble relaxing 3   5. Being so restless that it is hard to sit still 0   6. Becoming easily annoyed or irritable 3   7. Feeling afraid, as if something awful might happen 3   LORI-7 Total Score 18   If you checked any problems, how difficult have they made it for you to do your work, take care of things at home, or get along with other people? Extremely difficult     How many servings of fruits and vegetables do you eat daily?  2-3  On average, how many sweetened beverages do you drink each day (Examples: soda, juice, sweet tea, etc.  Do NOT count diet or artificially sweetened beverages)?   0  How many days per week do you exercise enough to make your heart beat faster? 3 or less  How many minutes a day do you exercise enough to make your heart beat faster? 9 or less  How many days per week do  you miss taking your medication? 0        Review of Systems  Constitutional, HEENT, cardiovascular, pulmonary, gi and gu systems are negative, except as otherwise noted.      Objective    /66   Pulse 84   Temp 97.7  F (36.5  C) (Temporal)   Resp 18   Wt 91.6 kg (202 lb)   LMP 02/02/2024 (Approximate)   SpO2 100%   BMI 32.08 kg/m    Body mass index is 32.08 kg/m .  Physical Exam   GENERAL: alert and no distress    Encounter Diagnosis   Name Primary?    Panic attack      Counseling is that patient will be started on Lexapro 10 mg for 2 weeks then increase to 20 mg as we will also put her on Ativan twice daily until we get the Lexapro to become effective.  She will need to be seen in 1 month's time has been or the patient can contact me if they have questions before follow-up        Signed Electronically by: Merrick Justice MD    Answers submitted by the patient for this visit:  Patient Health Questionnaire (Submitted on 2/7/2024)  If you checked off any problems, how difficult have these problems made it for you to do your work, take care of things at home, or get along with other people?: Extremely difficult  PHQ9 TOTAL SCORE: 24  LORI-7 (Submitted on 2/7/2024)  LORI 7 TOTAL SCORE: 21

## 2024-02-08 NOTE — TELEPHONE ENCOUNTER
Visit note from today 2/8/24 talks about starting Lexapro.    Patient is wanting clarification as she thought she was being started on Zoloft.     Rosi Felipe, JOHANNAN, RN

## 2024-02-11 NOTE — TELEPHONE ENCOUNTER
If she would rather try zoloft, we can switch her to that.  Reshma had told me he sent Lexapro and ativan.     Electronically signed by:  Mello Nguyễn M.D.  2/11/2024

## 2024-02-12 NOTE — TELEPHONE ENCOUNTER
RN Triage    Patient Contact    Attempt # 1    Was call answered?  No.  Left message on voicemail with information to call me back.    Raquel Mcghee RN on 2/12/2024 at 2:58 PM

## 2024-02-12 NOTE — TELEPHONE ENCOUNTER
Pt calling back     RN relayed provider message     Pt will stick with lexapro at this time.     Pt is asking about connecting with nystroms looks like referral was placed- has this been faxed over?    Mireille Byrd RN

## 2024-02-29 ENCOUNTER — TELEPHONE (OUTPATIENT)
Dept: FAMILY MEDICINE | Facility: CLINIC | Age: 39
End: 2024-02-29

## 2024-02-29 NOTE — TELEPHONE ENCOUNTER
Patient called stating she recently seen Dr. Justice for anxiety, and he told her he would follow up with her in a couple weeks. She states she does not have a follow up appointment made yet, and would like to get one set.     She states she is not having worsening, and just has some questions for Dr. Justice in regards to things they discussed at her last appointment.     Appointment made for 3/28/24.     Advised patient to call back if she experiences new or worsening of symptoms before her appointment.     JOHANNA BaldwinN, RN

## 2024-03-05 ENCOUNTER — TELEPHONE (OUTPATIENT)
Dept: SCHEDULING | Facility: CLINIC | Age: 39
End: 2024-03-05
Payer: COMMERCIAL

## 2024-03-05 NOTE — TELEPHONE ENCOUNTER
Reason for Call:  Appointment Request    Patient requesting this type of appt: Chronic Diease Management/Medication/Follow-Up    Requested provider:  Argelia Vigil    Reason patient unable to be scheduled: Not within requested timeframe    When does patient want to be seen/preferred time: 1-2 weeks    Comments: Pt requesting appointment with provider to be evaluated for ongoing pain throughout her body. States it is primarily in her back/chest/shoulders/left arm. Tingling sensation on and off. Pt states she has been seen for this elsewhere, they instructed her this is related to her anxiety. Would like to see provider to get a second opinion about these symptoms.    Could we send this information to you in [facility] or would you prefer to receive a phone call?:   Patient would prefer a phone call   Okay to leave a detailed message?: Yes at Home number on file [phone] (home)    Call taken on 3/5/2024 at 2:53 PM by Dianne Boles

## 2024-03-28 ENCOUNTER — OFFICE VISIT (OUTPATIENT)
Dept: FAMILY MEDICINE | Facility: CLINIC | Age: 39
End: 2024-03-28
Payer: COMMERCIAL

## 2024-03-28 VITALS
SYSTOLIC BLOOD PRESSURE: 112 MMHG | RESPIRATION RATE: 16 BRPM | HEIGHT: 66 IN | TEMPERATURE: 97.8 F | BODY MASS INDEX: 33.27 KG/M2 | DIASTOLIC BLOOD PRESSURE: 72 MMHG | OXYGEN SATURATION: 98 % | WEIGHT: 207 LBS | HEART RATE: 76 BPM

## 2024-03-28 DIAGNOSIS — R00.2 PALPITATIONS: Primary | ICD-10-CM

## 2024-03-28 DIAGNOSIS — M25.50 ARTHRALGIA, UNSPECIFIED JOINT: ICD-10-CM

## 2024-03-28 DIAGNOSIS — R07.9 CHEST PAIN, UNSPECIFIED TYPE: ICD-10-CM

## 2024-03-28 LAB — CRP SERPL-MCNC: <3 MG/L

## 2024-03-28 PROCEDURE — 86140 C-REACTIVE PROTEIN: CPT | Performed by: FAMILY MEDICINE

## 2024-03-28 PROCEDURE — 36415 COLL VENOUS BLD VENIPUNCTURE: CPT | Performed by: FAMILY MEDICINE

## 2024-03-28 PROCEDURE — 99214 OFFICE O/P EST MOD 30 MIN: CPT | Performed by: FAMILY MEDICINE

## 2024-03-28 ASSESSMENT — ENCOUNTER SYMPTOMS: NERVOUS/ANXIOUS: 1

## 2024-03-28 NOTE — PROGRESS NOTES
Assessment & Plan     Palpitations    - Echocardiogram Exercise Stress; Future    Chest pain, unspecified type  Stress echo will be ordered most.  She concerns  - Echocardiogram Exercise Stress; Future    Arthralgia, unspecified joint    - CRP, inflammation; Future  - CRP, inflammation    CRP was normal she will work with her massage therapist and take ibuprofen on a as needed basis.                Nicolas Reyes is a 38 year old, presenting for the following health issues:  Anxiety    Anxiety    History of Present Illness       Reason for visit:  Abnormal pain in legs, shortness of breath after minimal activity.    She eats 0-1 servings of fruits and vegetables daily.She consumes 0 sweetened beverage(s) daily.She exercises with enough effort to increase her heart rate 10 to 19 minutes per day.  She exercises with enough effort to increase her heart rate 3 or less days per week.   She is taking medications regularly.     We talked about her concerns with shortness of breath and pain in her legs.  I think is because she is starting to workout heavier now.  She also has some symptoms consistent with plantar fasciitis I did have her to ask her massage therapist to help her work on this.  She also can take some ibuprofen which she has not been doing because of her workout symptoms.  She is still concerned about possibilities of having an abnormal heart and heart attack so I do think we will get a stress test to show her that her heart is healthy and she can work out without fear.  Her  is in agreement with this plan also.  She is doing much better with her anxiety she has not had a panic attack in some time she has not had to use her Ativan she did not start her Lexapro.  She is seeing her therapist every other week.  No other complaints at this time.      Review of Systems  Constitutional, HEENT, cardiovascular, pulmonary, gi and gu systems are negative, except as otherwise noted.      Objective    LMP  02/02/2024 (Approximate)   There is no height or weight on file to calculate BMI.  Physical Exam   GENERAL: alert and no distress            Signed Electronically by: Merrick Justice MD

## 2024-04-01 ENCOUNTER — HOSPITAL ENCOUNTER (OUTPATIENT)
Dept: CARDIOLOGY | Facility: CLINIC | Age: 39
Discharge: HOME | End: 2024-04-01
Attending: FAMILY MEDICINE | Admitting: FAMILY MEDICINE
Payer: COMMERCIAL

## 2024-04-01 DIAGNOSIS — R07.9 CHEST PAIN, UNSPECIFIED TYPE: ICD-10-CM

## 2024-04-01 DIAGNOSIS — R00.2 PALPITATIONS: ICD-10-CM

## 2024-04-01 PROCEDURE — 93325 DOPPLER ECHO COLOR FLOW MAPG: CPT | Mod: 26 | Performed by: INTERNAL MEDICINE

## 2024-04-01 PROCEDURE — 93325 DOPPLER ECHO COLOR FLOW MAPG: CPT | Mod: TC

## 2024-04-01 PROCEDURE — 93016 CV STRESS TEST SUPVJ ONLY: CPT | Performed by: INTERNAL MEDICINE

## 2024-04-01 PROCEDURE — 93321 DOPPLER ECHO F-UP/LMTD STD: CPT | Mod: 26 | Performed by: INTERNAL MEDICINE

## 2024-04-01 PROCEDURE — 93018 CV STRESS TEST I&R ONLY: CPT | Performed by: INTERNAL MEDICINE

## 2024-04-01 PROCEDURE — 93350 STRESS TTE ONLY: CPT | Mod: TC

## 2024-04-01 PROCEDURE — 93350 STRESS TTE ONLY: CPT | Mod: 26 | Performed by: INTERNAL MEDICINE

## 2024-07-07 ENCOUNTER — HEALTH MAINTENANCE LETTER (OUTPATIENT)
Age: 39
End: 2024-07-07

## 2024-10-09 ASSESSMENT — ANXIETY QUESTIONNAIRES: GAD7 TOTAL SCORE: 21

## 2024-10-09 ASSESSMENT — PATIENT HEALTH QUESTIONNAIRE - PHQ9: SUM OF ALL RESPONSES TO PHQ QUESTIONS 1-9: 24

## 2024-11-08 ENCOUNTER — HOSPITAL ENCOUNTER (OUTPATIENT)
Dept: MAMMOGRAPHY | Facility: CLINIC | Age: 39
Discharge: HOME | End: 2024-11-08
Attending: FAMILY MEDICINE | Admitting: FAMILY MEDICINE
Payer: COMMERCIAL

## 2024-11-08 DIAGNOSIS — Z12.31 VISIT FOR SCREENING MAMMOGRAM: ICD-10-CM

## 2024-11-08 PROCEDURE — 77063 BREAST TOMOSYNTHESIS BI: CPT

## 2025-02-09 ENCOUNTER — HEALTH MAINTENANCE LETTER (OUTPATIENT)
Age: 40
End: 2025-02-09

## (undated) DEVICE — GLOVE PROTEXIS W/NEU-THERA 7.5  2D73TE75

## (undated) DEVICE — CLAMP CORD

## (undated) DEVICE — DRSG TEGADERM 6X8" 1628

## (undated) DEVICE — CATH TRAY FOLEY 16FR DRAINAGE BAG STATLOCK 899916

## (undated) DEVICE — SU DERMABOND PROPEN .5ML DPP6

## (undated) DEVICE — DRSG ABDOMINAL 07 1/2X8" 7197D

## (undated) DEVICE — SOL NACL 0.9% IRRIG 1000ML BOTTLE 07138-09

## (undated) DEVICE — SU PLAIN 0 FN-2 27" N864H

## (undated) DEVICE — PACK C-SECTION

## (undated) DEVICE — STPL SKIN SUBCUTICULAR INSORB 2025

## (undated) DEVICE — PREP CHLORAPREP 26ML TINTED ORANGE  260815

## (undated) DEVICE — STOCKING SLEEVE COMPRESSION CALF MED

## (undated) DEVICE — SU MONOCRYL 0 CT-1 36" UND Y946H

## (undated) RX ORDER — MORPHINE SULFATE 1 MG/ML
INJECTION, SOLUTION EPIDURAL; INTRATHECAL; INTRAVENOUS
Status: DISPENSED
Start: 2020-05-18

## (undated) RX ORDER — ONDANSETRON 2 MG/ML
INJECTION INTRAMUSCULAR; INTRAVENOUS
Status: DISPENSED
Start: 2020-05-18